# Patient Record
Sex: FEMALE | Race: WHITE | NOT HISPANIC OR LATINO
[De-identification: names, ages, dates, MRNs, and addresses within clinical notes are randomized per-mention and may not be internally consistent; named-entity substitution may affect disease eponyms.]

---

## 2017-03-02 ENCOUNTER — APPOINTMENT (OUTPATIENT)
Dept: OTOLARYNGOLOGY | Facility: CLINIC | Age: 72
End: 2017-03-02

## 2017-03-02 ENCOUNTER — RESULT REVIEW (OUTPATIENT)
Age: 72
End: 2017-03-02

## 2017-03-02 DIAGNOSIS — I10 ESSENTIAL (PRIMARY) HYPERTENSION: ICD-10-CM

## 2017-03-02 DIAGNOSIS — Z83.49 FAMILY HISTORY OF OTHER ENDOCRINE, NUTRITIONAL AND METABOLIC DISEASES: ICD-10-CM

## 2017-03-02 DIAGNOSIS — Z83.3 FAMILY HISTORY OF DIABETES MELLITUS: ICD-10-CM

## 2017-03-02 DIAGNOSIS — Z78.9 OTHER SPECIFIED HEALTH STATUS: ICD-10-CM

## 2017-03-03 ENCOUNTER — OUTPATIENT (OUTPATIENT)
Dept: OUTPATIENT SERVICES | Facility: HOSPITAL | Age: 72
LOS: 1 days | End: 2017-03-03
Payer: COMMERCIAL

## 2017-03-03 DIAGNOSIS — C44.321 SQUAMOUS CELL CARCINOMA OF SKIN OF NOSE: ICD-10-CM

## 2017-03-03 PROBLEM — I10 BENIGN ESSENTIAL HYPERTENSION: Status: RESOLVED | Noted: 2017-03-03 | Resolved: 2017-03-03

## 2017-03-03 PROBLEM — Z83.3 FAMILY HISTORY OF DIABETES MELLITUS: Status: ACTIVE | Noted: 2017-03-03

## 2017-03-03 PROBLEM — Z83.49 FAMILY HISTORY OF HYPERLIPIDEMIA: Status: ACTIVE | Noted: 2017-03-03

## 2017-03-03 PROBLEM — Z78.9 ALCOHOL INGESTION: Status: ACTIVE | Noted: 2017-03-03

## 2017-03-06 ENCOUNTER — FORM ENCOUNTER (OUTPATIENT)
Age: 72
End: 2017-03-06

## 2017-03-07 ENCOUNTER — OUTPATIENT (OUTPATIENT)
Dept: OUTPATIENT SERVICES | Facility: HOSPITAL | Age: 72
LOS: 1 days | End: 2017-03-07
Payer: COMMERCIAL

## 2017-03-07 LAB — SURGICAL PATHOLOGY STUDY: SIGNIFICANT CHANGE UP

## 2017-03-07 PROCEDURE — 70491 CT SOFT TISSUE NECK W/DYE: CPT | Mod: 26

## 2017-03-07 PROCEDURE — 70486 CT MAXILLOFACIAL W/O DYE: CPT | Mod: 26

## 2017-03-12 ENCOUNTER — FORM ENCOUNTER (OUTPATIENT)
Age: 72
End: 2017-03-12

## 2017-03-13 ENCOUNTER — RESULT REVIEW (OUTPATIENT)
Age: 72
End: 2017-03-13

## 2017-03-13 ENCOUNTER — OUTPATIENT (OUTPATIENT)
Dept: OUTPATIENT SERVICES | Facility: HOSPITAL | Age: 72
LOS: 1 days | End: 2017-03-13
Payer: COMMERCIAL

## 2017-03-13 PROCEDURE — 78195 LYMPH SYSTEM IMAGING: CPT | Mod: 26

## 2017-03-13 NOTE — ASU PATIENT PROFILE, ADULT - PMH
Cataracts, bilateral    Diabetes mellitus    High cholesterol    Hypertension    Malignant melanoma    PAF (paroxysmal atrial fibrillation)  with rapid vr  SCC (squamous cell carcinoma)  nose  Umbilical hernia Cataracts, bilateral    Diabetes mellitus    Ectopic pregnancy    High cholesterol    Hypertension    Malignant melanoma    PAF (paroxysmal atrial fibrillation)  with rapid vr  SCC (squamous cell carcinoma)  nose  Umbilical hernia

## 2017-03-13 NOTE — ASU PATIENT PROFILE, ADULT - PSH
H/O bilateral salpingo-oophorectomy H/O:  section    History of salpingo-oophorectomy H/O:  section    History of Mohs surgery for squamous cell carcinoma in situ of skin  nose  History of salpingo-oophorectomy Elective surgery  heart recorder  H/O:  section    History of Mohs surgery for squamous cell carcinoma in situ of skin  nose  History of salpingo-oophorectomy

## 2017-03-14 ENCOUNTER — APPOINTMENT (OUTPATIENT)
Dept: OTOLARYNGOLOGY | Facility: HOSPITAL | Age: 72
End: 2017-03-14

## 2017-03-14 ENCOUNTER — INPATIENT (INPATIENT)
Facility: HOSPITAL | Age: 72
LOS: 0 days | Discharge: HOME | DRG: 113 | End: 2017-03-15
Attending: OTOLARYNGOLOGY | Admitting: OTOLARYNGOLOGY
Payer: COMMERCIAL

## 2017-03-14 VITALS
DIASTOLIC BLOOD PRESSURE: 86 MMHG | OXYGEN SATURATION: 96 % | RESPIRATION RATE: 18 BRPM | HEIGHT: 62 IN | SYSTOLIC BLOOD PRESSURE: 121 MMHG | HEART RATE: 83 BPM | WEIGHT: 138.89 LBS | TEMPERATURE: 98 F

## 2017-03-14 DIAGNOSIS — Z41.9 ENCOUNTER FOR PROCEDURE FOR PURPOSES OTHER THAN REMEDYING HEALTH STATE, UNSPECIFIED: Chronic | ICD-10-CM

## 2017-03-14 DIAGNOSIS — Z90.79 ACQUIRED ABSENCE OF OTHER GENITAL ORGAN(S): Chronic | ICD-10-CM

## 2017-03-14 DIAGNOSIS — Z98.891 HISTORY OF UTERINE SCAR FROM PREVIOUS SURGERY: Chronic | ICD-10-CM

## 2017-03-14 DIAGNOSIS — Z98.890 OTHER SPECIFIED POSTPROCEDURAL STATES: Chronic | ICD-10-CM

## 2017-03-14 PROCEDURE — 38542 EXPLORE DEEP NODE(S) NECK: CPT | Mod: RT

## 2017-03-14 PROCEDURE — 31231 NASAL ENDOSCOPY DX: CPT | Mod: 59

## 2017-03-14 PROCEDURE — 21016 RESECT FACE/SCALP TUM 2 CM/>: CPT

## 2017-03-14 PROCEDURE — 67400 EXPLORE/BIOPSY EYE SOCKET: CPT | Mod: RT

## 2017-03-14 PROCEDURE — 30150 RHINECTOMY PARTIAL: CPT

## 2017-03-14 PROCEDURE — 38900 IO MAP OF SENT LYMPH NODE: CPT

## 2017-03-14 PROCEDURE — 42410 EXCISE PAROTID GLAND/LESION: CPT | Mod: RT

## 2017-03-14 RX ORDER — POTASSIUM CHLORIDE 20 MEQ
10 PACKET (EA) ORAL DAILY
Qty: 0 | Refills: 0 | Status: DISCONTINUED | OUTPATIENT
Start: 2017-03-14 | End: 2017-03-15

## 2017-03-14 RX ORDER — OXYCODONE HYDROCHLORIDE 5 MG/1
5 TABLET ORAL EVERY 8 HOURS
Qty: 0 | Refills: 0 | Status: DISCONTINUED | OUTPATIENT
Start: 2017-03-14 | End: 2017-03-15

## 2017-03-14 RX ORDER — SODIUM CHLORIDE 9 MG/ML
1000 INJECTION INTRAMUSCULAR; INTRAVENOUS; SUBCUTANEOUS
Qty: 0 | Refills: 0 | Status: DISCONTINUED | OUTPATIENT
Start: 2017-03-14 | End: 2017-03-15

## 2017-03-14 RX ORDER — ONDANSETRON 8 MG/1
4 TABLET, FILM COATED ORAL EVERY 6 HOURS
Qty: 0 | Refills: 0 | Status: DISCONTINUED | OUTPATIENT
Start: 2017-03-14 | End: 2017-03-15

## 2017-03-14 RX ORDER — ACETAMINOPHEN 500 MG
325 TABLET ORAL EVERY 4 HOURS
Qty: 0 | Refills: 0 | Status: DISCONTINUED | OUTPATIENT
Start: 2017-03-14 | End: 2017-03-15

## 2017-03-14 RX ORDER — DILTIAZEM HCL 120 MG
300 CAPSULE, EXT RELEASE 24 HR ORAL DAILY
Qty: 0 | Refills: 0 | Status: DISCONTINUED | OUTPATIENT
Start: 2017-03-14 | End: 2017-03-15

## 2017-03-14 RX ORDER — AMIODARONE HYDROCHLORIDE 400 MG/1
200 TABLET ORAL AT BEDTIME
Qty: 0 | Refills: 0 | Status: DISCONTINUED | OUTPATIENT
Start: 2017-03-14 | End: 2017-03-15

## 2017-03-14 RX ORDER — DIGOXIN 250 MCG
0.12 TABLET ORAL DAILY
Qty: 0 | Refills: 0 | Status: DISCONTINUED | OUTPATIENT
Start: 2017-03-14 | End: 2017-03-15

## 2017-03-14 RX ORDER — AMIODARONE HYDROCHLORIDE 400 MG/1
100 TABLET ORAL DAILY
Qty: 0 | Refills: 0 | Status: DISCONTINUED | OUTPATIENT
Start: 2017-03-14 | End: 2017-03-14

## 2017-03-14 RX ADMIN — ONDANSETRON 4 MILLIGRAM(S): 8 TABLET, FILM COATED ORAL at 13:29

## 2017-03-14 RX ADMIN — AMIODARONE HYDROCHLORIDE 200 MILLIGRAM(S): 400 TABLET ORAL at 23:10

## 2017-03-14 RX ADMIN — SODIUM CHLORIDE 100 MILLILITER(S): 9 INJECTION INTRAMUSCULAR; INTRAVENOUS; SUBCUTANEOUS at 20:27

## 2017-03-14 RX ADMIN — Medication 325 MILLIGRAM(S): at 20:25

## 2017-03-14 RX ADMIN — Medication 325 MILLIGRAM(S): at 21:25

## 2017-03-14 RX ADMIN — Medication 0.12 MILLIGRAM(S): at 18:06

## 2017-03-14 NOTE — BRIEF OPERATIVE NOTE - PROCEDURE
Biopsy, lymph node, sentinel  03/14/2017  right parotid and level 3 neck  Active  OAHMED  Wide local excision, lesion, skin, head and neck  03/14/2017  WLE and partial rhinectomy of right naso-facial cutaneous SCC  Active  OAHMED

## 2017-03-14 NOTE — PROGRESS NOTE ADULT - SUBJECTIVE AND OBJECTIVE BOX
ENT [facility] POST OP CHECK    Procedure    No acute events post op. Pain controlled. Tolerating PO. Ambulating. Denies nausea/vomiting.       MEDICATIONS:  Antiinfectives:     IV fluids:  sodium chloride 0.9%. 1000milliLiter(s) IV Continuous <Continuous>  potassium chloride    Tablet ER 10milliEquivalent(s) Oral daily    Hematologic/Anticoagulation:    Pain medications/Neuro:  acetaminophen   Tablet 325milliGRAM(s) Oral every 4 hours PRN  acetaminophen   Tablet. 325milliGRAM(s) Oral every 4 hours PRN  oxyCODONE  5 mG/acetaminophen 325 mG 1Tablet(s) Oral every 4 hours PRN  ondansetron Injectable 4milliGRAM(s) IV Push every 6 hours PRN  oxyCODONE IR 5milliGRAM(s) Oral every 8 hours PRN  busPIRone 5milliGRAM(s) Oral daily    Endocrine Medications:     All other standing medications:   digoxin     Tablet 0.125milliGRAM(s) Oral daily  diltiazem   CD 300milliGRAM(s) Oral daily    All other PRN medications:      Vital Signs Last 24 Hrs  T(C): 36.5, Max: 37.1 (03-14 @ 15:15)  T(F): 97.7, Max: 98.7 (03-14 @ 15:15)  HR: 89 (78 - 90)  BP: 157/83 (121/86 - 161/69)  BP(mean): --  RR: 18 (14 - 18)  SpO2: 96% (94% - 99%)      I & Os for current day (as of 03-14 @ 17:49)  =============================================  IN:    sodium chloride 0.9%.: 400 ml    Total IN: 400 ml  ---------------------------------------------  OUT:    Total OUT: 0 ml  ---------------------------------------------  Total NET: 400 ml        PHYSICAL EXAM:    ENT EXAM-   Constitutional: Well-developed, well-nourished.    Face: Incision C/D/I, soft, flat, minimal drainage. Dressing minimally saturated. replaced  OC/OP:  Floor of mouth, buccal mucosa, lips, hard palate, soft palate, uvula, posterior pharyngeal wall normal.  Mucosa moist.  Neuro/Psych:  A&O x 3.  Mood stable.  Affect appropriate  Pulm:  No dyspnea, non-labored breathing on room air    LABS:  CBC-      Coagulation Studies-    Endocrine Panel-              RADIOLOGY & ADDITIONAL STUDIES:      Assessment/Plan:  71y Female s/p excision of nasal mass  - pain control  - anti-emetics prn  - regular diet  - ambulate  - discussed case with dr diggs who also examined the patient and agrees with the plan    PPX: SCDs, I/S    Dispo planning: no needs

## 2017-03-15 VITALS
SYSTOLIC BLOOD PRESSURE: 116 MMHG | HEART RATE: 94 BPM | OXYGEN SATURATION: 95 % | DIASTOLIC BLOOD PRESSURE: 84 MMHG | TEMPERATURE: 98 F | RESPIRATION RATE: 16 BRPM

## 2017-03-15 PROCEDURE — 88341 IMHCHEM/IMCYTCHM EA ADD ANTB: CPT

## 2017-03-15 PROCEDURE — 88307 TISSUE EXAM BY PATHOLOGIST: CPT

## 2017-03-15 PROCEDURE — 88305 TISSUE EXAM BY PATHOLOGIST: CPT

## 2017-03-15 RX ADMIN — Medication 325 MILLIGRAM(S): at 06:35

## 2017-03-15 RX ADMIN — Medication 300 MILLIGRAM(S): at 05:06

## 2017-03-15 RX ADMIN — Medication 325 MILLIGRAM(S): at 07:12

## 2017-03-15 RX ADMIN — Medication 325 MILLIGRAM(S): at 03:00

## 2017-03-15 RX ADMIN — Medication 5 MILLIGRAM(S): at 10:44

## 2017-03-15 RX ADMIN — Medication 10 MILLIEQUIVALENT(S): at 10:44

## 2017-03-15 RX ADMIN — Medication 325 MILLIGRAM(S): at 02:03

## 2017-03-15 NOTE — PROGRESS NOTE ADULT - SUBJECTIVE AND OBJECTIVE BOX
ENT [facility]NS Progress    No issues overnight. Pain controlled. No N/V. Mild serosanguinous drainage from facial wound requiring 3-4 dressing changes. Also adds that about 2 weeks ago, she had bilateral sudden hearing decrease while on antibiotics. Antibiotics were stopped. Has severe hearing decrease b/l with hearing aids.    AFVSS, NAD  R neck incision c/d/i, flat  R naso-facial wound with xeroform sutured in place, scant serosang drainage  R eye with mild epiphora and hyperemia. EOMI, no pain on EOMs, vision grossly intact    A/P:  POD#0 s/p WLE of R facial cutaneous SCC and R neck SLNB. Doing well   - Dispo planning, likely discharge home today  - Will have pt see Dr. Ponce as outpatient for b/l hearing decrease eval, possibly antibx related ototoxicity  - Pain control  - Dressing changes PRN  - Pt to see Plastic Surgery as outpatient for reconstruction planning  - Reg diet ENT HNS Progress    No issues overnight. Pain controlled. No N/V. Mild serosanguinous drainage from facial wound requiring 3-4 dressing changes. Also adds that about 2 weeks ago, she had bilateral hearing decrease which has felt like "being under water." Has had similar sensation years ago requiring ear tubes. At baseline has poor hearing b/l. Over past 2 weeks, has been using 's old hearing aids for amplification.    AFVSS, NAD  R neck incision c/d/i, flat  R naso-facial wound with xeroform sutured in place, scant serosang drainage  R eye with mild epiphora and hyperemia. EOMI, no pain on EOMs, vision grossly intact  Vasquez midline  AC = BC b/l  TMs dull appearing b/l, EACs wnl    A/P:  POD#0 s/p WLE of R facial cutaneous SCC and R neck SLNB. Doing well   - Dispo planning, likely discharge home today  - Will have pt see Dr. Ponce as outpatient for b/l hearing decrease eval, likely conductive hearing loss 2/2 effusion  - Pain control  - Dressing changes PRN  - Pt to see Plastic Surgery as outpatient for reconstruction planning  - Reg diet

## 2017-03-17 DIAGNOSIS — C79.2 SECONDARY MALIGNANT NEOPLASM OF SKIN: ICD-10-CM

## 2017-03-17 DIAGNOSIS — F32.9 MAJOR DEPRESSIVE DISORDER, SINGLE EPISODE, UNSPECIFIED: ICD-10-CM

## 2017-03-17 DIAGNOSIS — E11.9 TYPE 2 DIABETES MELLITUS WITHOUT COMPLICATIONS: ICD-10-CM

## 2017-03-17 DIAGNOSIS — C76.0 MALIGNANT NEOPLASM OF HEAD, FACE AND NECK: ICD-10-CM

## 2017-03-17 DIAGNOSIS — H91.8X9 OTHER SPECIFIED HEARING LOSS, UNSPECIFIED EAR: ICD-10-CM

## 2017-03-17 DIAGNOSIS — I48.0 PAROXYSMAL ATRIAL FIBRILLATION: ICD-10-CM

## 2017-03-17 DIAGNOSIS — I10 ESSENTIAL (PRIMARY) HYPERTENSION: ICD-10-CM

## 2017-03-17 DIAGNOSIS — K21.9 GASTRO-ESOPHAGEAL REFLUX DISEASE WITHOUT ESOPHAGITIS: ICD-10-CM

## 2017-03-17 DIAGNOSIS — C44.122 SQUAMOUS CELL CARCINOMA OF SKIN OF RIGHT EYELID, INCLUDING CANTHUS: ICD-10-CM

## 2017-03-17 LAB — SURGICAL PATHOLOGY STUDY: SIGNIFICANT CHANGE UP

## 2017-03-20 ENCOUNTER — APPOINTMENT (OUTPATIENT)
Dept: OTOLARYNGOLOGY | Facility: CLINIC | Age: 72
End: 2017-03-20

## 2017-03-20 VITALS
HEIGHT: 62 IN | SYSTOLIC BLOOD PRESSURE: 113 MMHG | RESPIRATION RATE: 16 BRPM | DIASTOLIC BLOOD PRESSURE: 64 MMHG | WEIGHT: 136.91 LBS | TEMPERATURE: 97 F | OXYGEN SATURATION: 98 % | HEART RATE: 76 BPM

## 2017-03-20 PROBLEM — H26.9 UNSPECIFIED CATARACT: Chronic | Status: ACTIVE | Noted: 2017-03-13

## 2017-03-20 PROBLEM — I48.0 PAROXYSMAL ATRIAL FIBRILLATION: Chronic | Status: ACTIVE | Noted: 2017-03-13

## 2017-03-20 PROBLEM — I10 ESSENTIAL (PRIMARY) HYPERTENSION: Chronic | Status: ACTIVE | Noted: 2017-03-13

## 2017-03-20 PROBLEM — C44.92 SQUAMOUS CELL CARCINOMA OF SKIN, UNSPECIFIED: Chronic | Status: ACTIVE | Noted: 2017-03-13

## 2017-03-20 PROBLEM — C43.9 MALIGNANT MELANOMA OF SKIN, UNSPECIFIED: Chronic | Status: ACTIVE | Noted: 2017-03-13

## 2017-03-20 PROBLEM — O00.90 UNSPECIFIED ECTOPIC PREGNANCY WITHOUT INTRAUTERINE PREGNANCY: Chronic | Status: ACTIVE | Noted: 2017-03-14

## 2017-03-20 PROBLEM — E78.00 PURE HYPERCHOLESTEROLEMIA, UNSPECIFIED: Chronic | Status: ACTIVE | Noted: 2017-03-13

## 2017-03-20 PROBLEM — K42.9 UMBILICAL HERNIA WITHOUT OBSTRUCTION OR GANGRENE: Chronic | Status: ACTIVE | Noted: 2017-03-13

## 2017-03-20 NOTE — PATIENT PROFILE ADULT. - PSH
Elective surgery  heart recorder  H/O:  section    History of Mohs surgery for squamous cell carcinoma in situ of skin  nose  History of salpingo-oophorectomy

## 2017-03-20 NOTE — PATIENT PROFILE ADULT. - PMH
Cataracts, bilateral    Diabetes mellitus    Ectopic pregnancy    High cholesterol    Hypertension    Malignant melanoma    PAF (paroxysmal atrial fibrillation)  with rapid vr  SCC (squamous cell carcinoma)  nose  Surgery, elective  tumor removal, nose, 3/14/2017  Umbilical hernia Cataracts, bilateral    Depression    Diabetes mellitus    Ectopic pregnancy    High cholesterol    Hypertension    Malignant melanoma    PAF (paroxysmal atrial fibrillation)  with rapid vr  SCC (squamous cell carcinoma)  nose  Surgery, elective  tumor removal, nose, 3/14/2017  Umbilical hernia

## 2017-03-21 ENCOUNTER — OTHER (OUTPATIENT)
Age: 72
End: 2017-03-21

## 2017-03-21 ENCOUNTER — INPATIENT (INPATIENT)
Facility: HOSPITAL | Age: 72
LOS: 0 days | Discharge: HOME | DRG: 502 | End: 2017-03-22
Attending: SURGERY | Admitting: SURGERY
Payer: COMMERCIAL

## 2017-03-21 DIAGNOSIS — Z98.890 OTHER SPECIFIED POSTPROCEDURAL STATES: Chronic | ICD-10-CM

## 2017-03-21 DIAGNOSIS — Z98.891 HISTORY OF UTERINE SCAR FROM PREVIOUS SURGERY: Chronic | ICD-10-CM

## 2017-03-21 DIAGNOSIS — Z90.79 ACQUIRED ABSENCE OF OTHER GENITAL ORGAN(S): Chronic | ICD-10-CM

## 2017-03-21 DIAGNOSIS — Z41.9 ENCOUNTER FOR PROCEDURE FOR PURPOSES OTHER THAN REMEDYING HEALTH STATE, UNSPECIFIED: Chronic | ICD-10-CM

## 2017-03-21 DIAGNOSIS — C44.92 SQUAMOUS CELL CARCINOMA OF SKIN, UNSPECIFIED: ICD-10-CM

## 2017-03-21 PROCEDURE — 88321 CONSLTJ&REPRT SLD PREP ELSWR: CPT

## 2017-03-21 RX ORDER — INSULIN LISPRO 100/ML
VIAL (ML) SUBCUTANEOUS
Qty: 0 | Refills: 0 | Status: DISCONTINUED | OUTPATIENT
Start: 2017-03-21 | End: 2017-03-22

## 2017-03-21 RX ORDER — DIGOXIN 250 MCG
0.12 TABLET ORAL DAILY
Qty: 0 | Refills: 0 | Status: DISCONTINUED | OUTPATIENT
Start: 2017-03-21 | End: 2017-03-22

## 2017-03-21 RX ORDER — POTASSIUM CHLORIDE 20 MEQ
10 PACKET (EA) ORAL DAILY
Qty: 0 | Refills: 0 | Status: DISCONTINUED | OUTPATIENT
Start: 2017-03-21 | End: 2017-03-22

## 2017-03-21 RX ORDER — ONDANSETRON 8 MG/1
4 TABLET, FILM COATED ORAL EVERY 6 HOURS
Qty: 0 | Refills: 0 | Status: DISCONTINUED | OUTPATIENT
Start: 2017-03-21 | End: 2017-03-22

## 2017-03-21 RX ORDER — DEXTROSE 50 % IN WATER 50 %
1 SYRINGE (ML) INTRAVENOUS ONCE
Qty: 0 | Refills: 0 | Status: DISCONTINUED | OUTPATIENT
Start: 2017-03-21 | End: 2017-03-22

## 2017-03-21 RX ORDER — DEXTROSE 50 % IN WATER 50 %
25 SYRINGE (ML) INTRAVENOUS ONCE
Qty: 0 | Refills: 0 | Status: DISCONTINUED | OUTPATIENT
Start: 2017-03-21 | End: 2017-03-22

## 2017-03-21 RX ORDER — LABETALOL HCL 100 MG
10 TABLET ORAL
Qty: 0 | Refills: 0 | Status: DISCONTINUED | OUTPATIENT
Start: 2017-03-21 | End: 2017-03-22

## 2017-03-21 RX ORDER — CHOLECALCIFEROL (VITAMIN D3) 125 MCG
1000 CAPSULE ORAL DAILY
Qty: 0 | Refills: 0 | Status: DISCONTINUED | OUTPATIENT
Start: 2017-03-21 | End: 2017-03-22

## 2017-03-21 RX ORDER — BACITRACIN ZINC 500 UNIT/G
1 OINTMENT IN PACKET (EA) TOPICAL
Qty: 0 | Refills: 0 | Status: DISCONTINUED | OUTPATIENT
Start: 2017-03-21 | End: 2017-03-22

## 2017-03-21 RX ORDER — DILTIAZEM HCL 120 MG
300 CAPSULE, EXT RELEASE 24 HR ORAL DAILY
Qty: 0 | Refills: 0 | Status: DISCONTINUED | OUTPATIENT
Start: 2017-03-21 | End: 2017-03-22

## 2017-03-21 RX ORDER — GLUCAGON INJECTION, SOLUTION 0.5 MG/.1ML
1 INJECTION, SOLUTION SUBCUTANEOUS ONCE
Qty: 0 | Refills: 0 | Status: DISCONTINUED | OUTPATIENT
Start: 2017-03-21 | End: 2017-03-22

## 2017-03-21 RX ORDER — ACETAMINOPHEN 500 MG
650 TABLET ORAL EVERY 6 HOURS
Qty: 0 | Refills: 0 | Status: DISCONTINUED | OUTPATIENT
Start: 2017-03-22 | End: 2017-03-22

## 2017-03-21 RX ORDER — DEXTROSE 50 % IN WATER 50 %
12.5 SYRINGE (ML) INTRAVENOUS ONCE
Qty: 0 | Refills: 0 | Status: DISCONTINUED | OUTPATIENT
Start: 2017-03-21 | End: 2017-03-22

## 2017-03-21 RX ORDER — MORPHINE SULFATE 50 MG/1
2 CAPSULE, EXTENDED RELEASE ORAL
Qty: 0 | Refills: 0 | Status: DISCONTINUED | OUTPATIENT
Start: 2017-03-21 | End: 2017-03-22

## 2017-03-21 RX ORDER — PANTOPRAZOLE SODIUM 20 MG/1
40 TABLET, DELAYED RELEASE ORAL
Qty: 0 | Refills: 0 | Status: DISCONTINUED | OUTPATIENT
Start: 2017-03-21 | End: 2017-03-22

## 2017-03-21 RX ORDER — ASPIRIN/CALCIUM CARB/MAGNESIUM 324 MG
81 TABLET ORAL DAILY
Qty: 0 | Refills: 0 | Status: DISCONTINUED | OUTPATIENT
Start: 2017-03-21 | End: 2017-03-21

## 2017-03-21 RX ORDER — CEFAZOLIN SODIUM 1 G
1000 VIAL (EA) INJECTION EVERY 8 HOURS
Qty: 0 | Refills: 0 | Status: DISCONTINUED | OUTPATIENT
Start: 2017-03-21 | End: 2017-03-22

## 2017-03-21 RX ORDER — AMIODARONE HYDROCHLORIDE 400 MG/1
200 TABLET ORAL DAILY
Qty: 0 | Refills: 0 | Status: DISCONTINUED | OUTPATIENT
Start: 2017-03-22 | End: 2017-03-22

## 2017-03-21 RX ORDER — PREGABALIN 225 MG/1
1000 CAPSULE ORAL DAILY
Qty: 0 | Refills: 0 | Status: DISCONTINUED | OUTPATIENT
Start: 2017-03-21 | End: 2017-03-22

## 2017-03-21 RX ORDER — SODIUM CHLORIDE 9 MG/ML
1000 INJECTION, SOLUTION INTRAVENOUS
Qty: 0 | Refills: 0 | Status: DISCONTINUED | OUTPATIENT
Start: 2017-03-21 | End: 2017-03-22

## 2017-03-21 RX ORDER — ASPIRIN/CALCIUM CARB/MAGNESIUM 324 MG
81 TABLET ORAL DAILY
Qty: 0 | Refills: 0 | Status: DISCONTINUED | OUTPATIENT
Start: 2017-03-21 | End: 2017-03-22

## 2017-03-21 RX ADMIN — Medication: at 12:17

## 2017-03-21 RX ADMIN — Medication 100 MILLIGRAM(S): at 22:00

## 2017-03-21 RX ADMIN — Medication: at 17:56

## 2017-03-21 RX ADMIN — Medication: at 22:21

## 2017-03-21 RX ADMIN — Medication 100 MILLIGRAM(S): at 14:50

## 2017-03-21 NOTE — H&P PST ADULT - HISTORY OF PRESENT ILLNESS
71F s/p WLE of R facial cutaneous SCC and R neck SLNB on 3/14 with Dr. Carter now here for nasal reconstruction with Dr. Elliott.

## 2017-03-22 ENCOUNTER — TRANSCRIPTION ENCOUNTER (OUTPATIENT)
Age: 72
End: 2017-03-22

## 2017-03-22 VITALS — TEMPERATURE: 98 F

## 2017-03-22 PROCEDURE — 36415 COLL VENOUS BLD VENIPUNCTURE: CPT

## 2017-03-22 PROCEDURE — 83036 HEMOGLOBIN GLYCOSYLATED A1C: CPT

## 2017-03-22 RX ORDER — BACITRACIN ZINC 500 UNIT/G
1 OINTMENT IN PACKET (EA) TOPICAL
Qty: 1 | Refills: 0 | OUTPATIENT
Start: 2017-03-22

## 2017-03-22 RX ADMIN — Medication 5 MILLIGRAM(S): at 11:16

## 2017-03-22 RX ADMIN — PREGABALIN 1000 MICROGRAM(S): 225 CAPSULE ORAL at 11:16

## 2017-03-22 RX ADMIN — Medication 1 APPLICATION(S): at 06:22

## 2017-03-22 RX ADMIN — Medication 1: at 11:51

## 2017-03-22 RX ADMIN — Medication 100 MILLIGRAM(S): at 06:22

## 2017-03-22 RX ADMIN — AMIODARONE HYDROCHLORIDE 200 MILLIGRAM(S): 400 TABLET ORAL at 09:41

## 2017-03-22 RX ADMIN — Medication 81 MILLIGRAM(S): at 11:16

## 2017-03-22 RX ADMIN — Medication 300 MILLIGRAM(S): at 09:41

## 2017-03-22 RX ADMIN — Medication 0.12 MILLIGRAM(S): at 06:22

## 2017-03-22 RX ADMIN — Medication: at 07:51

## 2017-03-22 RX ADMIN — Medication 1000 UNIT(S): at 11:16

## 2017-03-22 NOTE — DISCHARGE NOTE ADULT - MEDICATION SUMMARY - MEDICATIONS TO TAKE
I will START or STAY ON the medications listed below when I get home from the hospital:    slow magnesium  -- 1 tab(s) by mouth once a day  -- Indication: For home medication    aspirin 81 mg oral tablet  -- 1 tab(s) by mouth once a day  -- Indication: For home medication    Percocet 5/325 oral tablet  -- 1 tab(s) by mouth every 4 hours, As Needed -for moderate pain MDD:6  -- Caution federal law prohibits the transfer of this drug to any person other  than the person for whom it was prescribed.  May cause drowsiness.  Alcohol may intensify this effect.  Use care when operating dangerous machinery.  This prescription cannot be refilled.  This product contains acetaminophen.  Do not use  with any other product containing acetaminophen to prevent possible liver damage.  Using more of this medication than prescribed may cause serious breathing problems.    -- Indication: For pain    digoxin 125 mcg (0.125 mg) oral tablet  -- 1 tab(s) by mouth once a day  -- Indication: For home medication    amiodarone 200 mg oral tablet  -- 1 tab(s) by mouth once a day  -- Indication: For home medication    diltiaZEM 300 mg/24 hours oral capsule, extended release  -- 1 cap(s) by mouth once a day  -- Indication: For home medication    BuSpar  -- 5 milligram(s) by mouth once a day  -- Indication: For home medication    bacitracin 500 units/g topical ointment  -- 1 application on skin 2 times a day  -- Indication: For incision     potassium chloride 10 mEq oral capsule, extended release  --  by mouth once a day  -- Indication: For home medication    amoxicillin-clavulanate 875 mg-125 mg oral tablet  -- 1 tab(s) by mouth 2 times a day  -- Finish all this medication unless otherwise directed by prescriber.  Take with food or milk.    -- Indication: For prophylaxis    NexIUM  -- 40 milligram(s) by mouth , As Needed  -- Indication: For home medication    Vitamin D3 1000 intl units oral capsule  -- 1 cap(s) by mouth once a day  -- Indication: For home medication    Vitamin B12 1000 mcg oral tablet  -- 1 tab(s) by mouth once a day  -- Indication: For home medication

## 2017-03-22 NOTE — DISCHARGE NOTE ADULT - PLAN OF CARE
reconstruction •	Report any fever, chills, difficulty breathing, difficulty swallowing, increased bleeding or purulent drainage from your incision site, or any significant new swelling to the doctor immediately.  •	Diet: Resume normal diet, avoid any sharp foods such as chips or pizza crusts for the first day or two after your procedure.   •	Activity: no heavy lifting, do not lift anything heavier than a gallon of milk until after you follow up appointment with your doctor, no strenuous activity such as running or biking.  •	Shower/Bathing:  you may shower from the neck down, but do not get your face wet.   •	Wound care: apply bacitracin twice daily for 10 days. keep incision clean and dry.   •	Take all medications as prescribed.   •	Continue all of your normal home medications unless told otherwise.  •	Avoid any NSAIDS or ibuprofen/asa containing products you may take Tylenol for mild pain.

## 2017-03-22 NOTE — DISCHARGE NOTE ADULT - CARE PROVIDER_API CALL
Vineet Elliott (MD; DDS), Otolaryngology  Head and Neck Surgery  [address] [place] [address]  Phone: [phone]  Fax: [phone]

## 2017-03-22 NOTE — DISCHARGE NOTE ADULT - CARE PLAN
Principal Discharge DX:	SCC (squamous cell carcinoma)  Goal:	reconstruction  Instructions for follow-up, activity and diet:	•	Report any fever, chills, difficulty breathing, difficulty swallowing, increased bleeding or purulent drainage from your incision site, or any significant new swelling to the doctor immediately.  •	Diet: Resume normal diet, avoid any sharp foods such as chips or pizza crusts for the first day or two after your procedure.   •	Activity: no heavy lifting, do not lift anything heavier than a gallon of milk until after you follow up appointment with your doctor, no strenuous activity such as running or biking.  •	Shower/Bathing:  you may shower from the neck down, but do not get your face wet.   •	Wound care: apply bacitracin twice daily for 10 days. keep incision clean and dry.   •	Take all medications as prescribed.   •	Continue all of your normal home medications unless told otherwise.  •	Avoid any NSAIDS or ibuprofen/asa containing products you may take Tylenol for mild pain.

## 2017-03-22 NOTE — DISCHARGE NOTE ADULT - PATIENT PORTAL LINK FT
“You can access the [employer] Patient Portal, offered by [facility], by registering with the following website: [email or web address]”

## 2017-03-24 DIAGNOSIS — Z28.21 IMMUNIZATION NOT CARRIED OUT BECAUSE OF PATIENT REFUSAL: ICD-10-CM

## 2017-03-24 DIAGNOSIS — K21.9 GASTRO-ESOPHAGEAL REFLUX DISEASE WITHOUT ESOPHAGITIS: ICD-10-CM

## 2017-03-24 DIAGNOSIS — M95.2 OTHER ACQUIRED DEFORMITY OF HEAD: ICD-10-CM

## 2017-03-24 DIAGNOSIS — I48.0 PAROXYSMAL ATRIAL FIBRILLATION: ICD-10-CM

## 2017-03-24 DIAGNOSIS — Z98.890 OTHER SPECIFIED POSTPROCEDURAL STATES: ICD-10-CM

## 2017-03-24 DIAGNOSIS — Z87.891 PERSONAL HISTORY OF NICOTINE DEPENDENCE: ICD-10-CM

## 2017-03-24 DIAGNOSIS — C69.92 MALIGNANT NEOPLASM OF UNSPECIFIED SITE OF LEFT EYE: ICD-10-CM

## 2017-03-24 DIAGNOSIS — I10 ESSENTIAL (PRIMARY) HYPERTENSION: ICD-10-CM

## 2017-03-24 DIAGNOSIS — Z85.828 PERSONAL HISTORY OF OTHER MALIGNANT NEOPLASM OF SKIN: ICD-10-CM

## 2017-03-24 DIAGNOSIS — E11.9 TYPE 2 DIABETES MELLITUS WITHOUT COMPLICATIONS: ICD-10-CM

## 2017-03-29 PROBLEM — F32.9 MAJOR DEPRESSIVE DISORDER, SINGLE EPISODE, UNSPECIFIED: Chronic | Status: ACTIVE | Noted: 2017-03-20

## 2017-03-29 PROBLEM — Z41.9 ENCOUNTER FOR PROCEDURE FOR PURPOSES OTHER THAN REMEDYING HEALTH STATE, UNSPECIFIED: Chronic | Status: ACTIVE | Noted: 2017-03-20

## 2017-04-03 ENCOUNTER — APPOINTMENT (OUTPATIENT)
Dept: RADIATION ONCOLOGY | Facility: CLINIC | Age: 72
End: 2017-04-03

## 2017-04-03 ENCOUNTER — APPOINTMENT (OUTPATIENT)
Dept: OTOLARYNGOLOGY | Facility: CLINIC | Age: 72
End: 2017-04-03

## 2017-04-03 VITALS
SYSTOLIC BLOOD PRESSURE: 147 MMHG | WEIGHT: 143.5 LBS | DIASTOLIC BLOOD PRESSURE: 81 MMHG | BODY MASS INDEX: 26.41 KG/M2 | HEART RATE: 84 BPM | OXYGEN SATURATION: 96 % | HEIGHT: 62 IN

## 2017-04-03 VITALS
HEIGHT: 62 IN | BODY MASS INDEX: 25.58 KG/M2 | WEIGHT: 139 LBS | SYSTOLIC BLOOD PRESSURE: 147 MMHG | TEMPERATURE: 98.1 F | HEART RATE: 86 BPM | DIASTOLIC BLOOD PRESSURE: 75 MMHG

## 2017-04-03 DIAGNOSIS — C44.321 SQUAMOUS CELL CARCINOMA OF SKIN OF NOSE: ICD-10-CM

## 2017-04-03 DIAGNOSIS — I10 ESSENTIAL (PRIMARY) HYPERTENSION: ICD-10-CM

## 2017-04-10 ENCOUNTER — CLINICAL ADVICE (OUTPATIENT)
Age: 72
End: 2017-04-10

## 2017-04-13 NOTE — ASU PATIENT PROFILE, ADULT - PMH
Cataracts, bilateral    Depression    Diabetes mellitus    Ectopic pregnancy    High cholesterol    Hypertension    Malignant melanoma    PAF (paroxysmal atrial fibrillation)  with rapid vr  SCC (squamous cell carcinoma)  nose  Surgery, elective  tumor removal, nose, 3/14/2017  Umbilical hernia

## 2017-04-14 ENCOUNTER — OUTPATIENT (OUTPATIENT)
Dept: OUTPATIENT SERVICES | Facility: HOSPITAL | Age: 72
LOS: 1 days | Discharge: HOME | End: 2017-04-14
Payer: COMMERCIAL

## 2017-04-14 VITALS
SYSTOLIC BLOOD PRESSURE: 148 MMHG | HEIGHT: 62 IN | TEMPERATURE: 97 F | DIASTOLIC BLOOD PRESSURE: 75 MMHG | RESPIRATION RATE: 18 BRPM | OXYGEN SATURATION: 99 % | HEART RATE: 96 BPM | WEIGHT: 138.01 LBS

## 2017-04-14 VITALS
DIASTOLIC BLOOD PRESSURE: 81 MMHG | TEMPERATURE: 98 F | SYSTOLIC BLOOD PRESSURE: 139 MMHG | RESPIRATION RATE: 15 BRPM | HEART RATE: 88 BPM | OXYGEN SATURATION: 96 %

## 2017-04-14 DIAGNOSIS — E11.9 TYPE 2 DIABETES MELLITUS WITHOUT COMPLICATIONS: ICD-10-CM

## 2017-04-14 DIAGNOSIS — I48.0 PAROXYSMAL ATRIAL FIBRILLATION: ICD-10-CM

## 2017-04-14 DIAGNOSIS — Z98.890 OTHER SPECIFIED POSTPROCEDURAL STATES: Chronic | ICD-10-CM

## 2017-04-14 DIAGNOSIS — Z98.891 HISTORY OF UTERINE SCAR FROM PREVIOUS SURGERY: Chronic | ICD-10-CM

## 2017-04-14 DIAGNOSIS — I10 ESSENTIAL (PRIMARY) HYPERTENSION: ICD-10-CM

## 2017-04-14 DIAGNOSIS — C76.0 MALIGNANT NEOPLASM OF HEAD, FACE AND NECK: ICD-10-CM

## 2017-04-14 DIAGNOSIS — Z48.3 AFTERCARE FOLLOWING SURGERY FOR NEOPLASM: ICD-10-CM

## 2017-04-14 DIAGNOSIS — Z79.82 LONG TERM (CURRENT) USE OF ASPIRIN: ICD-10-CM

## 2017-04-14 DIAGNOSIS — Z90.79 ACQUIRED ABSENCE OF OTHER GENITAL ORGAN(S): Chronic | ICD-10-CM

## 2017-04-14 DIAGNOSIS — Z41.9 ENCOUNTER FOR PROCEDURE FOR PURPOSES OTHER THAN REMEDYING HEALTH STATE, UNSPECIFIED: Chronic | ICD-10-CM

## 2017-04-14 PROCEDURE — 15620 DELAY FLAP F/C/C/N/AX/G/H/F: CPT

## 2017-04-14 RX ORDER — BACITRACIN ZINC 500 UNIT/G
1 OINTMENT IN PACKET (EA) TOPICAL
Qty: 1 | Refills: 0 | OUTPATIENT
Start: 2017-04-14

## 2017-04-14 NOTE — ASU PREOP CHECKLIST - 1.
Dr. Monahan made aware of blood pressure @11:3am bp 148/75. no interventions or new orders at this time pt to proceed with planned procedure Dr. Monahan made aware of vital signs,specifically temperature and blood pressure @11:28am bp 148/75. no interventions or new orders at this time pt to proceed with planned procedure

## 2017-04-21 PROCEDURE — 70486 CT MAXILLOFACIAL W/O DYE: CPT

## 2017-04-21 PROCEDURE — A9541: CPT

## 2017-04-21 PROCEDURE — 70491 CT SOFT TISSUE NECK W/DYE: CPT

## 2017-04-21 PROCEDURE — 78195 LYMPH SYSTEM IMAGING: CPT

## 2017-05-15 VITALS
OXYGEN SATURATION: 88 % | DIASTOLIC BLOOD PRESSURE: 58 MMHG | TEMPERATURE: 98 F | RESPIRATION RATE: 15 BRPM | WEIGHT: 134.92 LBS | SYSTOLIC BLOOD PRESSURE: 131 MMHG | HEART RATE: 83 BPM | HEIGHT: 62 IN

## 2017-05-15 RX ORDER — ASPIRIN/CALCIUM CARB/MAGNESIUM 324 MG
1 TABLET ORAL
Qty: 0 | Refills: 0 | COMMUNITY

## 2017-05-15 RX ORDER — AMIODARONE HYDROCHLORIDE 400 MG/1
1 TABLET ORAL
Qty: 0 | Refills: 0 | COMMUNITY

## 2017-05-16 ENCOUNTER — OUTPATIENT (OUTPATIENT)
Dept: OUTPATIENT SERVICES | Facility: HOSPITAL | Age: 72
LOS: 1 days | Discharge: HOME | End: 2017-05-16
Payer: COMMERCIAL

## 2017-05-16 VITALS
SYSTOLIC BLOOD PRESSURE: 118 MMHG | HEART RATE: 62 BPM | OXYGEN SATURATION: 95 % | RESPIRATION RATE: 19 BRPM | TEMPERATURE: 98 F | DIASTOLIC BLOOD PRESSURE: 66 MMHG

## 2017-05-16 DIAGNOSIS — Z90.79 ACQUIRED ABSENCE OF OTHER GENITAL ORGAN(S): Chronic | ICD-10-CM

## 2017-05-16 DIAGNOSIS — Z98.890 OTHER SPECIFIED POSTPROCEDURAL STATES: Chronic | ICD-10-CM

## 2017-05-16 DIAGNOSIS — Z98.891 HISTORY OF UTERINE SCAR FROM PREVIOUS SURGERY: Chronic | ICD-10-CM

## 2017-05-16 DIAGNOSIS — Z41.9 ENCOUNTER FOR PROCEDURE FOR PURPOSES OTHER THAN REMEDYING HEALTH STATE, UNSPECIFIED: Chronic | ICD-10-CM

## 2017-05-16 PROCEDURE — 15876 SUCTION LIPECTOMY HEAD&NECK: CPT

## 2017-05-16 PROCEDURE — 68815 PROBE NASOLACRIMAL DUCT: CPT | Mod: RT

## 2017-05-16 PROCEDURE — 68720 CREATE TEAR SAC DRAIN: CPT | Mod: RT

## 2017-05-16 PROCEDURE — C1889: CPT

## 2017-05-16 RX ORDER — ONDANSETRON 8 MG/1
4 TABLET, FILM COATED ORAL EVERY 4 HOURS
Qty: 0 | Refills: 0 | Status: DISCONTINUED | OUTPATIENT
Start: 2017-05-16 | End: 2017-05-16

## 2017-05-16 RX ORDER — CEPHALEXIN 500 MG
1 CAPSULE ORAL
Qty: 10 | Refills: 0 | OUTPATIENT
Start: 2017-05-16 | End: 2017-05-21

## 2017-05-16 RX ORDER — SODIUM CHLORIDE 9 MG/ML
1000 INJECTION, SOLUTION INTRAVENOUS
Qty: 0 | Refills: 0 | Status: DISCONTINUED | OUTPATIENT
Start: 2017-05-16 | End: 2017-05-16

## 2017-05-16 RX ORDER — ACETAMINOPHEN 500 MG
650 TABLET ORAL ONCE
Qty: 0 | Refills: 0 | Status: DISCONTINUED | OUTPATIENT
Start: 2017-05-16 | End: 2017-05-16

## 2017-05-16 RX ORDER — MORPHINE SULFATE 50 MG/1
2 CAPSULE, EXTENDED RELEASE ORAL
Qty: 0 | Refills: 0 | Status: DISCONTINUED | OUTPATIENT
Start: 2017-05-16 | End: 2017-05-16

## 2017-05-16 NOTE — BRIEF OPERATIVE NOTE - PROCEDURE
Debulking, flap, face  05/16/2017  debulking of paramedian forhead flap, medial canthopexy, DCR  Active  [id number]

## 2017-05-19 DIAGNOSIS — H04.541 STENOSIS OF RIGHT LACRIMAL CANALICULI: ICD-10-CM

## 2017-05-19 DIAGNOSIS — Z85.820 PERSONAL HISTORY OF MALIGNANT MELANOMA OF SKIN: ICD-10-CM

## 2017-05-19 DIAGNOSIS — H04.201 UNSPECIFIED EPIPHORA, RIGHT SIDE: ICD-10-CM

## 2017-05-19 DIAGNOSIS — L90.5 SCAR CONDITIONS AND FIBROSIS OF SKIN: ICD-10-CM

## 2017-07-26 NOTE — ASU PATIENT PROFILE, ADULT - NS SC CAGE ALCOHOL CUT DOWN
no Posterior Auricular Interpolation Flap Text: A decision was made to reconstruct the defect utilizing an interpolation axial flap and a staged reconstruction.  A telfa template was made of the defect.  This telfa template was then used to outline the posterior auricular interpolation flap.  The donor area for the pedicle flap was then injected with anesthesia.  The flap was excised through the skin and subcutaneous tissue down to the layer of the underlying musculature.  The pedicle flap was carefully excised within this deep plane to maintain its blood supply.  The edges of the donor site were undermined.   The donor site was closed in a primary fashion.  The pedicle was then rotated into position and sutured.  Once the tube was sutured into place, adequate blood supply was confirmed with blanching and refill.  The pedicle was then wrapped with xeroform gauze and dressed appropriately with a telfa and gauze bandage to ensure continued blood supply and protect the attached pedicle.

## 2017-09-07 NOTE — ASU PATIENT PROFILE, ADULT - NS PRO PT RIGHT SUPPORT PERSON
[facility] H&P/Consult:    Patient: Carol Menendez Date: 2017   : 1927 Attending: Glenn Cantrell MD         Admission date: 2017    ICU admit date:  2017  Intubation date:  n/a    Summary: Carol Menendez is a 89 year old female with a history significant for  has a past medical history of Alzheimer's disease; Depression; Diabetes mellitus (CMS/[facility]); Essential (primary) hypertension; Osteoporosis, unspecified (10/26/2009); Other and unspecified hyperlipidemia; Thyroid condition; and Urinary incontinence. who presents after a fall in her memory care unit at half-way. Fall was unwitnessed. She crashed to the floor. Workup at [facility] demonstrated a head laceration which was not repaired and a very small traumatic intraventricular hemorrhage. For this she was transferred to [facility] for further care.     Inpatient Problems  -- Intraventricular Hemorrhage  -- Advanced dementia  -- Hypertension  -- Mechanical Fall  -- Diabetes Mellitus    ASSESSMENT/PLAN    Neuro: Traumatic intraventricular hemorrhage from a fall; advanced dementia; head laceration, parieto-occipital. CT with small IVH, likely result of hypertension. Unusual locale for traumatic injury. Appears to be at her baseline.   -- Repeat head CT in the morning  -- Namenda 10 mg b.i.d.  -- Hold home trazodone    Pulmonary: No acute issues    CV: History of hypertension; with hypertensive urgency on admission. Much improved on nicardipine overnight.   - Home Norvasc 5 mg   - Wean Nicardipine to keep systolic blood pressure 140 mmHg     Renal:  No acute issues  Lab Results   Component Value Date    CREATININE 0.78 2017    GFRNA 66 2017    GFRA 76 2017     GI: Tolerating general diet, no acute issues.     Heme: No acute issues  Lab Results   Component Value Date    WBC 9.6 2017    HGB 13.9 2017     2017     Endocrine: History of diabetes on home Lantus 10  daily, Humalog sliding scale. Hyperglycemia now that PO intake has resumed. Hx of hypothyroid.   -- Restart home lantus  -- Restart home levothyroxine    ID: UA with trace leukocyte esterase, but otherwise unremarkable.     Goals/Disposition:   -- Maintain in ICU; will likely discharge in AM if repeat CT is unchanged as she appears to be at her baseline.     BEST PRACTICES:  - VTE prophylaxis: Yes, Sequential compression device(s)  No pharmacologic Venous Thromboembolism prophylaxis due to Active Bleeding / Risk of Bleeding  - Glycemic control: Sliding scale insulin / ICU glycemic control  - Nutrition: N.p.o. for now  - Therapy/mobilization: Physical therapy consulted    ================================================================    HPI: Carol Menendez is a 89 year old female with a history significant for  has a past medical history of Alzheimer's disease; Depression; Diabetes mellitus (CMS/[facility]); Essential (primary) hypertension; Osteoporosis, unspecified (10/26/2009); Other and unspecified hyperlipidemia; Thyroid condition; and Urinary incontinence. who presents after a fall in her memory care unit at long-term. Fall was unwitnessed. She crashed to the floor. Workup at [facility] demonstrated a head laceration which was not repaired and a very small traumatic intraventricular hemorrhage. She is neurologically intact.        SUBJECTIVE: \"I feel just fine, how are you\". Wandering conversation      ================================================================    I/O last 3 completed shifts:  In: 827 [P.O.:25; I.V.:802]  Out: 350 [Urine:350]  No intake/output data recorded.    Vital Last Value 24 Hour Range   Temperature 98.7 °F (37.1 °C) (09/07/17 0500) Temp  Min: 98.2 °F (36.8 °C)  Max: 98.7 °F (37.1 °C)   Pulse 80 (09/07/17 0630) Pulse  Min: 68  Max: 102   Respiratory 25 (09/07/17 0630) Resp  Min: 16  Max: 67   Non-Invasive  Blood Pressure 157/70 (09/07/17 0630) BP  Min: 96/64   Max: 209/96   Pulse Oximetry 93 % (09/07/17 0630) SpO2  Min: 75 %  Max: 100 %   Arterial   Blood Pressure   No Data Recorded        Physical Exam:  General: Alert, elderly female resting in chair.   Neuro: Oriented to self only.  FC x4 with equal strength to all extremities. Sensation intact throughout. PERRL. Visual acuity intact to all fields. No ataxia to any extremities.   Neck: Supple, without JVD  Chest: Lung sounds clear with auscultation to all fields. Without accessory muscle use.   Heart: S1, S2, no rub, murmur, or gallop  Abdomen: Soft, nontender. Normoactive throughout all abdomonal quadrants.  Extremities: 2+ bilateral radial and DP pulses. No edema.   Skin: Warm, intact.     Pertinent Reviewed: Allergies, Medications, Labs, Imaging and Physician and Nursing Notes    ACCS  ELIO Hair  [phone]    Attending Physician Addendum  I evaluated and examined Carol Menendez with Mr Dupont. Together we reviewed relevant imaging and laboratory data, and jointly formulated the assessment and plan as documented.  ROS unable secondary to patient condition. My comments are reflected below.    Active Diagnoses:   1. Hypertensive crisis with resultant small IVH  2. Scalp laceration  3. Advanced dementia  4. Essential HTN  5. DM Type 2    Plan:   - Stable head CT this AM.   - Stable neurologic exam (at baseline for patient).   - Weaned off nicardipine this AM.   - Plan to return to memory care unit within next 24 hours.     Odessa Singh DO  [facility]  [phone] (pager)     Declines same name as above

## 2018-02-20 NOTE — PATIENT PROFILE ADULT. - NSALCOHOLFREQ_GEN_A_CORE_SD
Outpatient Physical Therapy Ortho Treatment Note   [place]     Patient Name: Pili Webster  : 1990  MRN: [id number]  Today's Date: 2018      Visit Date: 2018    Visit Dx:    ICD-10-CM ICD-9-CM   1. Chronic pain of right knee M25.561 719.46    G89.29 338.29       Patient Active Problem List   Diagnosis   • External hemorrhoid, thrombosed   • Status post laparoscopic cholecystectomy        Past Medical History:   Diagnosis Date   • Abdominal pain    • Anxiety and depression    • Arthritis    • Cholecystitis    • Constipation    • Frequent UTI    • Hemorrhoids    • Kidney stones    • Nausea & vomiting    • PCOS (polycystic ovarian syndrome)    • PONV (postoperative nausea and vomiting)    • Tachycardia         Past Surgical History:   Procedure Laterality Date   • ABDOMINAL SURGERY     • CHOLECYSTECTOMY N/A 2017    Procedure: CHOLECYSTECTOMY LAPAROSCOPIC;  Surgeon: Franco Mari MD;  Location: [facility];  Service:    • DIAGNOSTIC LAPAROSCOPY      x2   • DILATATION AND CURETTAGE     • WISDOM TOOTH EXTRACTION               PT Ortho       18 1500    Subjective Comments    Subjective Comments Patient notes 3/10 pain today; she reports that therapy has been helping with her pain.  -BE    Subjective Pain    Able to rate subjective pain? yes  -BE    Pre-Treatment Pain Level 3  -BE    Post-Treatment Pain Level 3  -BE      User Key  (r) = Recorded By, (t) = Taken By, (c) = Cosigned By    Initials Name Provider Type    BE Pili Walker, PT Physical Therapist                            PT Assessment/Plan       18 6431       PT Assessment    Assessment Comments Patient tolerated today's session well, with no increase in pain noted.  Patient progressed in ther ex to include increased repetitions of SLR and knee flexion with TB.  Verbal and tactile cues required to ensure correct form with exercises.  Patient displayed no adverse reactions with modalities at today's session.   "Ina Mena, PT, DPT, assisted in concluding today's session.  She will continue to be progressed per her tolerance and POC.  -BE     PT Plan    PT Plan Comments Progress per patient's tolerance and POC.  -BE       User Key  (r) = Recorded By, (t) = Taken By, (c) = Cosigned By    Initials Name Provider Type    BE Pili Walker, PT Physical Therapist                Modalities       02/20/18 1500          Moist Heat    MH Applied Yes   no redness following MH  -BE      Location right knee  -BE      Rx Minutes 10 mins  -BE      MH Prior to Rx Yes   w/ estim in supine position  -BE      Ultrasound 18495    Location R) medial knee  -BE      Rx Minutes 8 min  -BE      Duty Cycle 50  -BE      Frequency --   3.3MHz  -BE      Intensity - Wts/cm 1.2  -BE      ELECTRICAL STIMULATION    Attended/Unattended Unattended   no skin irritation observed following estim  -BE      Stimulation Type Pre-Mod  -BE      Max mAmp --   as to pt's tolerance  -BE      Location/Electrode Placement/Other R) knee  -BE      Rx Minutes 10 mins  -BE        User Key  (r) = Recorded By, (t) = Taken By, (c) = Cosigned By    Initials Name Provider Type    BE Pili Walker, PT Physical Therapist                Exercises       02/20/18 1500          Subjective Comments    Subjective Comments Patient notes 3/10 pain today; she reports that therapy has been helping with her pain.  -BE      Subjective Pain    Able to rate subjective pain? yes  -BE      Pre-Treatment Pain Level 3  -BE      Post-Treatment Pain Level 3  -BE      Exercise 1    Exercise Name 1 gastroc stretch 3x20\", ham stretch 3x20\", QS 15x2, SAQ 15x2, SLR 25x, LAQ 15x2, LE bike LV 2.5 x6 min, hip abd w/ tband (red) 10x2, heel slides 15x2, ball squeeze 15x2, knee flex w/ tband (red) 10x3  -BE      Cueing 1 Verbal;Tactile;Demo  -BE      Time (Minutes) 1 40 min  -BE        User Key  (r) = Recorded By, (t) = Taken By, (c) = Cosigned By    Initials Name Provider Type    LUIS JANG" Aaron, PT Physical Therapist                             Therapy Education  Given: HEP, Symptoms/condition management, Pain management  Program: Reinforced  How Provided: Verbal, Demonstration  Provided to: Patient  Level of Understanding: Verbalized, Demonstrated              Time Calculation:   Start Time: 1300  Stop Time: 1410  Time Calculation (min): 70 min    Therapy Charges for Today     Code Description Service Date Service Provider Modifiers Qty    99960226076 HC PT ELECTRICAL STIM UNATTENDED 2/20/2018 Pili Walker, PT  1    44399203627 HC PT THER PROC EA 15 MIN 2/20/2018 Pili Walker, PT GP 3                    Pili Walker, PT  2/20/2018      daily

## 2018-03-08 VITALS
OXYGEN SATURATION: 98 % | RESPIRATION RATE: 18 BRPM | WEIGHT: 138.89 LBS | TEMPERATURE: 97 F | SYSTOLIC BLOOD PRESSURE: 124 MMHG | HEART RATE: 99 BPM | HEIGHT: 62 IN | DIASTOLIC BLOOD PRESSURE: 65 MMHG

## 2018-03-09 ENCOUNTER — OUTPATIENT (OUTPATIENT)
Dept: OUTPATIENT SERVICES | Facility: HOSPITAL | Age: 73
LOS: 1 days | Discharge: HOME | End: 2018-03-09
Payer: COMMERCIAL

## 2018-03-09 DIAGNOSIS — Z98.891 HISTORY OF UTERINE SCAR FROM PREVIOUS SURGERY: Chronic | ICD-10-CM

## 2018-03-09 DIAGNOSIS — Z98.890 OTHER SPECIFIED POSTPROCEDURAL STATES: Chronic | ICD-10-CM

## 2018-03-09 DIAGNOSIS — Z41.9 ENCOUNTER FOR PROCEDURE FOR PURPOSES OTHER THAN REMEDYING HEALTH STATE, UNSPECIFIED: Chronic | ICD-10-CM

## 2018-03-09 DIAGNOSIS — Z90.79 ACQUIRED ABSENCE OF OTHER GENITAL ORGAN(S): Chronic | ICD-10-CM

## 2018-03-09 LAB — GLUCOSE BLDC GLUCOMTR-MCNC: 117 MG/DL — HIGH (ref 70–99)

## 2018-03-09 RX ORDER — ACETAMINOPHEN 500 MG
650 TABLET ORAL EVERY 6 HOURS
Qty: 0 | Refills: 0 | Status: DISCONTINUED | OUTPATIENT
Start: 2018-03-09 | End: 2018-03-09

## 2018-03-09 RX ORDER — METOPROLOL TARTRATE 50 MG
12.5 TABLET ORAL EVERY 12 HOURS
Qty: 0 | Refills: 0 | Status: DISCONTINUED | OUTPATIENT
Start: 2018-03-09 | End: 2018-03-10

## 2018-03-09 RX ORDER — CEFAZOLIN SODIUM 1 G
1000 VIAL (EA) INJECTION EVERY 8 HOURS
Qty: 0 | Refills: 0 | Status: DISCONTINUED | OUTPATIENT
Start: 2018-03-09 | End: 2018-03-10

## 2018-03-09 RX ORDER — MORPHINE SULFATE 50 MG/1
4 CAPSULE, EXTENDED RELEASE ORAL EVERY 4 HOURS
Qty: 0 | Refills: 0 | Status: DISCONTINUED | OUTPATIENT
Start: 2018-03-09 | End: 2018-03-09

## 2018-03-09 RX ORDER — OXYCODONE AND ACETAMINOPHEN 5; 325 MG/1; MG/1
1 TABLET ORAL EVERY 4 HOURS
Qty: 0 | Refills: 0 | Status: DISCONTINUED | OUTPATIENT
Start: 2018-03-09 | End: 2018-03-09

## 2018-03-09 RX ORDER — SODIUM CHLORIDE 9 MG/ML
1000 INJECTION, SOLUTION INTRAVENOUS
Qty: 0 | Refills: 0 | Status: DISCONTINUED | OUTPATIENT
Start: 2018-03-09 | End: 2018-03-10

## 2018-03-09 RX ORDER — CEFAZOLIN SODIUM 1 G
1000 VIAL (EA) INJECTION EVERY 8 HOURS
Qty: 0 | Refills: 0 | Status: DISCONTINUED | OUTPATIENT
Start: 2018-03-09 | End: 2018-03-09

## 2018-03-09 RX ORDER — ACETAMINOPHEN 500 MG
650 TABLET ORAL EVERY 6 HOURS
Qty: 0 | Refills: 0 | Status: DISCONTINUED | OUTPATIENT
Start: 2018-03-09 | End: 2018-03-10

## 2018-03-09 RX ORDER — OXYCODONE AND ACETAMINOPHEN 5; 325 MG/1; MG/1
1 TABLET ORAL EVERY 4 HOURS
Qty: 0 | Refills: 0 | Status: DISCONTINUED | OUTPATIENT
Start: 2018-03-09 | End: 2018-03-10

## 2018-03-09 RX ORDER — MORPHINE SULFATE 50 MG/1
4 CAPSULE, EXTENDED RELEASE ORAL EVERY 4 HOURS
Qty: 0 | Refills: 0 | Status: DISCONTINUED | OUTPATIENT
Start: 2018-03-09 | End: 2018-03-10

## 2018-03-09 RX ORDER — BENZOCAINE AND MENTHOL 5; 1 G/100ML; G/100ML
1 LIQUID ORAL
Qty: 0 | Refills: 0 | Status: DISCONTINUED | OUTPATIENT
Start: 2018-03-09 | End: 2018-03-09

## 2018-03-09 RX ORDER — PANTOPRAZOLE SODIUM 20 MG/1
40 TABLET, DELAYED RELEASE ORAL
Qty: 0 | Refills: 0 | Status: DISCONTINUED | OUTPATIENT
Start: 2018-03-09 | End: 2018-03-10

## 2018-03-09 RX ORDER — DILTIAZEM HCL 120 MG
300 CAPSULE, EXT RELEASE 24 HR ORAL DAILY
Qty: 0 | Refills: 0 | Status: DISCONTINUED | OUTPATIENT
Start: 2018-03-10 | End: 2018-03-10

## 2018-03-09 RX ORDER — MORPHINE SULFATE 50 MG/1
4 CAPSULE, EXTENDED RELEASE ORAL
Qty: 0 | Refills: 0 | Status: DISCONTINUED | OUTPATIENT
Start: 2018-03-09 | End: 2018-03-10

## 2018-03-09 RX ORDER — SODIUM CHLORIDE 9 MG/ML
1000 INJECTION, SOLUTION INTRAVENOUS
Qty: 0 | Refills: 0 | Status: DISCONTINUED | OUTPATIENT
Start: 2018-03-09 | End: 2018-03-09

## 2018-03-09 RX ORDER — DIGOXIN 250 MCG
0.12 TABLET ORAL DAILY
Qty: 0 | Refills: 0 | Status: DISCONTINUED | OUTPATIENT
Start: 2018-03-10 | End: 2018-03-10

## 2018-03-09 RX ORDER — BENZOCAINE AND MENTHOL 5; 1 G/100ML; G/100ML
1 LIQUID ORAL
Qty: 0 | Refills: 0 | Status: DISCONTINUED | OUTPATIENT
Start: 2018-03-09 | End: 2018-03-10

## 2018-03-09 RX ADMIN — Medication 1000 MILLIGRAM(S): at 23:58

## 2018-03-09 NOTE — PROGRESS NOTE ADULT - SUBJECTIVE AND OBJECTIVE BOX
PAUL-HNS POSTOP CHECK NOTE    72F hx carcinoma s/p resection and paramedian forehead flap for recon now POD0 b/l blepharoplasty, scar revision.    POD0: WILLOW. AFVSS. Awake and alert, Tolerating sips of liquids. Pain well controlled. C/o eye pain but no gaze restriction or vision changes.     Vital Signs Last 24 Hrs  T(C): 36.7 (09 Mar 2018 18:29), Max: 36.7 (09 Mar 2018 18:29)  T(F): 98 (09 Mar 2018 18:29), Max: 98 (09 Mar 2018 18:29)  HR: 92 (09 Mar 2018 19:16) (76 - 92)  BP: 122/72 (09 Mar 2018 19:16) (122/72 - 138/75)  BP(mean): 89 (09 Mar 2018 19:16) (89 - 105)  RR: 20 (09 Mar 2018 19:16) (14 - 20)  SpO2: 96% (09 Mar 2018 19:16) (96% - 98%)    PHYSICAL EXAM:  NAD, awake and appropriate  bleph and nasolabial incisions c/d/i  mild periorbital ecchymosis and edema  EOMI, no restriction, VA grossly intact, no proptosis  ERNANDEZ, wwp  nonlabored respirations     A/P:  72F hx carcinoma s/p resection and paramedian forehead flap for recon now POD0 b/l blepharoplasty, scar revision.    -prn pain  -prn nausea  -HOB@30  -ice packs to eyes  -prn HTN  -ambulate  -soft diet  -IVF   -SCDs    Dispo: regional room, 23hr    d/w chief and attending

## 2018-03-10 VITALS
DIASTOLIC BLOOD PRESSURE: 68 MMHG | SYSTOLIC BLOOD PRESSURE: 130 MMHG | TEMPERATURE: 97 F | RESPIRATION RATE: 16 BRPM | OXYGEN SATURATION: 97 % | HEART RATE: 80 BPM

## 2018-03-10 LAB — GLUCOSE BLDC GLUCOMTR-MCNC: 172 MG/DL — HIGH (ref 70–99)

## 2018-03-10 PROCEDURE — 15822 BLEPHAROPLASTY UPPER EYELID: CPT | Mod: 50

## 2018-03-10 PROCEDURE — 15820 BLEPHAROPLASTY LOWER EYELID: CPT | Mod: LT

## 2018-03-10 PROCEDURE — 68815 PROBE NASOLACRIMAL DUCT: CPT

## 2018-03-10 PROCEDURE — 21282 LATERAL CANTHOPEXY: CPT | Mod: LT

## 2018-03-10 PROCEDURE — 82962 GLUCOSE BLOOD TEST: CPT

## 2018-03-10 PROCEDURE — 12052 INTMD RPR FACE/MM 2.6-5.0 CM: CPT

## 2018-03-10 PROCEDURE — 11444 EXC FACE-MM B9+MARG 3.1-4 CM: CPT

## 2018-03-10 RX ORDER — AMOXICILLIN 250 MG/5ML
1 SUSPENSION, RECONSTITUTED, ORAL (ML) ORAL
Qty: 14 | Refills: 0 | OUTPATIENT
Start: 2018-03-10 | End: 2018-03-16

## 2018-03-10 RX ADMIN — OXYCODONE AND ACETAMINOPHEN 1 TABLET(S): 5; 325 TABLET ORAL at 02:22

## 2018-03-10 RX ADMIN — PANTOPRAZOLE SODIUM 40 MILLIGRAM(S): 20 TABLET, DELAYED RELEASE ORAL at 06:58

## 2018-03-10 RX ADMIN — Medication 0.12 MILLIGRAM(S): at 06:58

## 2018-03-10 RX ADMIN — OXYCODONE AND ACETAMINOPHEN 1 TABLET(S): 5; 325 TABLET ORAL at 00:09

## 2018-03-10 RX ADMIN — Medication 300 MILLIGRAM(S): at 06:58

## 2018-03-10 RX ADMIN — Medication 1000 MILLIGRAM(S): at 07:05

## 2018-03-10 NOTE — PROGRESS NOTE ADULT - SUBJECTIVE AND OBJECTIVE BOX
PAUL-HNS POSTOP CHECK NOTE    72F hx carcinoma s/p resection and paramedian forehead flap for recon now POD0 b/l blepharoplasty, scar revision.    Interval: WILLOW. AFVSS. Tolerating PO. Pain well controlled. Denies CP, SOB, n/v, vision changes. Has been using ice packs.    Vital Signs Last 24 Hrs  T(C): 36.2 (10 Mar 2018 09:01), Max: 37 (09 Mar 2018 21:47)  T(F): 97.2 (10 Mar 2018 09:01), Max: 98.6 (09 Mar 2018 21:47)  HR: 80 (10 Mar 2018 09:01) (65 - 96)  BP: 130/68 (10 Mar 2018 09:01) (115/70 - 140/75)  BP(mean): 89 (09 Mar 2018 20:30) (89 - 105)  RR: 16 (10 Mar 2018 09:01) (14 - 20)  SpO2: 97% (10 Mar 2018 09:01) (95% - 99%)    PHYSICAL EXAM:  NAD, awake and appropriate  bleph and nasolabial incisions c/d/i  mild periorbital ecchymosis and edema stable  EOMI, no restriction, PERRLA  VA grossly intact, no proptosis  ERNANDEZ, wwp  nonlabored respirations     A/P:  72F hx carcinoma s/p resection and paramedian forehead flap for recon now POD0 b/l blepharoplasty, scar revision.    -prn pain  -prn nausea  -HOB@30  -ice packs to eyes  -prn HTN  -ambulate  -art tears  -soft diet  -d/c IVF   -SCDs    Dispo: d/c home today    seen with rounding chief and d/w attending

## 2018-11-14 RX ORDER — DIGOXIN 250 MCG
1 TABLET ORAL
Qty: 0 | Refills: 0 | COMMUNITY

## 2018-11-14 RX ORDER — AMIODARONE HYDROCHLORIDE 400 MG/1
1 TABLET ORAL
Qty: 0 | Refills: 0 | COMMUNITY

## 2018-11-15 ENCOUNTER — RESULT REVIEW (OUTPATIENT)
Age: 73
End: 2018-11-15

## 2018-11-15 ENCOUNTER — INPATIENT (INPATIENT)
Facility: HOSPITAL | Age: 73
LOS: 0 days | Discharge: HOME | DRG: 517 | End: 2018-11-16
Attending: SURGERY | Admitting: SURGERY
Payer: COMMERCIAL

## 2018-11-15 VITALS
OXYGEN SATURATION: 96 % | HEIGHT: 62 IN | SYSTOLIC BLOOD PRESSURE: 134 MMHG | WEIGHT: 151.24 LBS | DIASTOLIC BLOOD PRESSURE: 63 MMHG | RESPIRATION RATE: 18 BRPM | TEMPERATURE: 97 F | HEART RATE: 77 BPM

## 2018-11-15 DIAGNOSIS — H26.9 UNSPECIFIED CATARACT: Chronic | ICD-10-CM

## 2018-11-15 DIAGNOSIS — Z41.9 ENCOUNTER FOR PROCEDURE FOR PURPOSES OTHER THAN REMEDYING HEALTH STATE, UNSPECIFIED: Chronic | ICD-10-CM

## 2018-11-15 DIAGNOSIS — Z98.891 HISTORY OF UTERINE SCAR FROM PREVIOUS SURGERY: Chronic | ICD-10-CM

## 2018-11-15 DIAGNOSIS — Z90.79 ACQUIRED ABSENCE OF OTHER GENITAL ORGAN(S): Chronic | ICD-10-CM

## 2018-11-15 DIAGNOSIS — Z98.890 OTHER SPECIFIED POSTPROCEDURAL STATES: Chronic | ICD-10-CM

## 2018-11-15 LAB — GLUCOSE BLDC GLUCOMTR-MCNC: 163 MG/DL — HIGH (ref 70–99)

## 2018-11-15 RX ORDER — SODIUM CHLORIDE 9 MG/ML
1000 INJECTION, SOLUTION INTRAVENOUS
Qty: 0 | Refills: 0 | Status: DISCONTINUED | OUTPATIENT
Start: 2018-11-15 | End: 2018-11-16

## 2018-11-15 RX ORDER — INSULIN LISPRO 100/ML
VIAL (ML) SUBCUTANEOUS
Qty: 0 | Refills: 0 | Status: DISCONTINUED | OUTPATIENT
Start: 2018-11-15 | End: 2018-11-16

## 2018-11-15 RX ORDER — GLUCAGON INJECTION, SOLUTION 0.5 MG/.1ML
1 INJECTION, SOLUTION SUBCUTANEOUS ONCE
Qty: 0 | Refills: 0 | Status: DISCONTINUED | OUTPATIENT
Start: 2018-11-15 | End: 2018-11-16

## 2018-11-15 RX ORDER — ONDANSETRON 8 MG/1
4 TABLET, FILM COATED ORAL EVERY 6 HOURS
Qty: 0 | Refills: 0 | Status: DISCONTINUED | OUTPATIENT
Start: 2018-11-15 | End: 2018-11-16

## 2018-11-15 RX ORDER — DEXTROSE 50 % IN WATER 50 %
25 SYRINGE (ML) INTRAVENOUS ONCE
Qty: 0 | Refills: 0 | Status: DISCONTINUED | OUTPATIENT
Start: 2018-11-15 | End: 2018-11-16

## 2018-11-15 RX ORDER — ACETAMINOPHEN 500 MG
650 TABLET ORAL EVERY 6 HOURS
Qty: 0 | Refills: 0 | Status: DISCONTINUED | OUTPATIENT
Start: 2018-11-15 | End: 2018-11-16

## 2018-11-15 RX ORDER — DEXTROSE 50 % IN WATER 50 %
12.5 SYRINGE (ML) INTRAVENOUS ONCE
Qty: 0 | Refills: 0 | Status: DISCONTINUED | OUTPATIENT
Start: 2018-11-15 | End: 2018-11-16

## 2018-11-15 RX ORDER — OXYCODONE AND ACETAMINOPHEN 5; 325 MG/1; MG/1
1 TABLET ORAL EVERY 4 HOURS
Qty: 0 | Refills: 0 | Status: DISCONTINUED | OUTPATIENT
Start: 2018-11-15 | End: 2018-11-16

## 2018-11-15 RX ORDER — HEPARIN SODIUM 5000 [USP'U]/ML
5000 INJECTION INTRAVENOUS; SUBCUTANEOUS EVERY 12 HOURS
Qty: 0 | Refills: 0 | Status: DISCONTINUED | OUTPATIENT
Start: 2018-11-15 | End: 2018-11-16

## 2018-11-15 RX ORDER — DEXTROSE 50 % IN WATER 50 %
15 SYRINGE (ML) INTRAVENOUS ONCE
Qty: 0 | Refills: 0 | Status: DISCONTINUED | OUTPATIENT
Start: 2018-11-15 | End: 2018-11-16

## 2018-11-15 RX ORDER — MORPHINE SULFATE 50 MG/1
4 CAPSULE, EXTENDED RELEASE ORAL
Qty: 0 | Refills: 0 | Status: COMPLETED | OUTPATIENT
Start: 2018-11-15 | End: 2018-11-15

## 2018-11-15 RX ADMIN — Medication 1: at 16:02

## 2018-11-15 RX ADMIN — MORPHINE SULFATE 4 MILLIGRAM(S): 50 CAPSULE, EXTENDED RELEASE ORAL at 20:26

## 2018-11-15 NOTE — PACU DISCHARGE NOTE - COMMENTS
pt aao x3.  VSS.  facial compression dsg c/d/i; suture line to mid forehead intact; sutures to right nose intact; MAICO to right head to ss.  denies c/o pain at present.  report given to RN on 8 wollman; pt to go to 849 via bed with transport

## 2018-11-15 NOTE — ASU PATIENT PROFILE, ADULT - PMH
Cataracts, bilateral    Depression    Ectopic pregnancy    High cholesterol    Hypertension    Malignant melanoma    PAF (paroxysmal atrial fibrillation)  with rapid vr  SCC (squamous cell carcinoma)  nose  Surgery, elective  tumor removal, nose, 3/14/2017  Umbilical hernia

## 2018-11-15 NOTE — ASU PATIENT PROFILE, ADULT - PSH
Cataracts, bilateral    Elective surgery  heart recorder  removed 2018  H/O:  section    History of Mohs surgery for squamous cell carcinoma in situ of skin  nose  History of salpingo-oophorectomy    Surgery, elective  right eye corner reconstruction x 7

## 2018-11-15 NOTE — BRIEF OPERATIVE NOTE - PROCEDURE
<<-----Click on this checkbox to enter Procedure Rhytidectomy of face and neck  11/15/2018    Active  AFRANTS  Scar revision  11/15/2018    Active  AFRANTS

## 2018-11-15 NOTE — BRIEF OPERATIVE NOTE - OPERATION/FINDINGS
Glabellar scar revision and right nasal scar revision. Rhytidectomy of face and neck. 1 MAICO drain.

## 2018-11-16 VITALS
OXYGEN SATURATION: 98 % | HEART RATE: 80 BPM | DIASTOLIC BLOOD PRESSURE: 75 MMHG | RESPIRATION RATE: 16 BRPM | TEMPERATURE: 99 F | SYSTOLIC BLOOD PRESSURE: 142 MMHG

## 2018-11-16 LAB
GLUCOSE BLDC GLUCOMTR-MCNC: 154 MG/DL — HIGH (ref 70–99)
GLUCOSE BLDC GLUCOMTR-MCNC: 178 MG/DL — HIGH (ref 70–99)
HBA1C BLD-MCNC: 6 % — HIGH (ref 4–5.6)

## 2018-11-16 RX ORDER — OXYCODONE AND ACETAMINOPHEN 5; 325 MG/1; MG/1
1 TABLET ORAL
Qty: 20 | Refills: 0
Start: 2018-11-16 | End: 2018-11-20

## 2018-11-16 RX ORDER — BACITRACIN ZINC 500 UNIT/G
1 OINTMENT IN PACKET (EA) TOPICAL
Qty: 5 | Refills: 0 | OUTPATIENT
Start: 2018-11-16 | End: 2018-11-22

## 2018-11-16 RX ADMIN — OXYCODONE AND ACETAMINOPHEN 1 TABLET(S): 5; 325 TABLET ORAL at 12:49

## 2018-11-16 RX ADMIN — Medication 1 TABLET(S): at 06:20

## 2018-11-16 RX ADMIN — HEPARIN SODIUM 5000 UNIT(S): 5000 INJECTION INTRAVENOUS; SUBCUTANEOUS at 06:20

## 2018-11-16 RX ADMIN — OXYCODONE AND ACETAMINOPHEN 1 TABLET(S): 5; 325 TABLET ORAL at 03:19

## 2018-11-16 RX ADMIN — Medication 1: at 07:43

## 2018-11-20 LAB — SURGICAL PATHOLOGY STUDY: SIGNIFICANT CHANGE UP

## 2018-11-20 PROCEDURE — 83036 HEMOGLOBIN GLYCOSYLATED A1C: CPT

## 2018-11-20 PROCEDURE — 88304 TISSUE EXAM BY PATHOLOGIST: CPT

## 2018-11-20 PROCEDURE — 82962 GLUCOSE BLOOD TEST: CPT

## 2018-11-20 PROCEDURE — 36415 COLL VENOUS BLD VENIPUNCTURE: CPT

## 2018-11-20 PROCEDURE — 88300 SURGICAL PATH GROSS: CPT

## 2018-11-27 DIAGNOSIS — M95.2 OTHER ACQUIRED DEFORMITY OF HEAD: ICD-10-CM

## 2018-11-27 DIAGNOSIS — I10 ESSENTIAL (PRIMARY) HYPERTENSION: ICD-10-CM

## 2018-11-27 DIAGNOSIS — L90.5 SCAR CONDITIONS AND FIBROSIS OF SKIN: ICD-10-CM

## 2018-11-27 DIAGNOSIS — F32.9 MAJOR DEPRESSIVE DISORDER, SINGLE EPISODE, UNSPECIFIED: ICD-10-CM

## 2018-11-27 DIAGNOSIS — Z85.828 PERSONAL HISTORY OF OTHER MALIGNANT NEOPLASM OF SKIN: ICD-10-CM

## 2019-11-06 NOTE — PATIENT PROFILE ADULT. - FUNCTIONAL LEVEL PRIOR: BATHING
Quality 110: Preventive Care And Screening: Influenza Immunization: Influenza Immunization Administered during Influenza season Detail Level: Detailed Quality 130: Documentation Of Current Medications In The Medical Record: Current Medications Documented Quality 131: Pain Assessment And Follow-Up: Pain assessment using a standardized tool is documented as negative, no follow-up plan required Quality 226: Preventive Care And Screening: Tobacco Use: Screening And Cessation Intervention: Patient screened for tobacco use and is an ex/non-smoker (0) independent

## 2019-11-12 NOTE — ASU PREOP CHECKLIST - ANTIBIOTIC
Patient is having a shingles outbreak she wanted to know if it was safe for her to take valtrex while pregnant   n/a

## 2019-12-18 ENCOUNTER — APPOINTMENT (OUTPATIENT)
Dept: NEPHROLOGY | Facility: CLINIC | Age: 74
End: 2019-12-18

## 2020-09-09 NOTE — ASU PATIENT PROFILE, ADULT - PRO ARRIVE FROM
Pre-operative instructions, medication directives and pain scales reviewed with patient. All questions the patient had  were answered. Re-assurance about surgical procedure and day of surgery routine given as needed. The patient verbalized understanding of the pre-op instructions.   home

## 2020-11-26 NOTE — ASU PATIENT PROFILE, ADULT - PSH
Anesthesia Post-op Note    Patient: Annetta Brown  Procedure(s) Performed:      POD#: 1  Anesthesia type: General    Patient location: [facility] Surgical Floor    Vital Last Value   Temperature 37.3 °C (99.1 °F) (11/26/20 0006)   Pulse 93 (11/26/20 0006)   Respiratory Rate 17 (11/26/20 0006)   Non-Invasive   Blood Pressure 108/68 (11/26/20 0006)   Arterial  Blood Pressure     Pulse Oximetry 93 % (11/26/20 0006)     Last 24 I/O:     Intake/Output Summary (Last 24 hours) at 11/26/2020 0018  Last data filed at 11/25/2020 1533  Gross per 24 hour   Intake 1425.68 ml   Output 70 ml   Net 1355.68 ml     Post-op vital signs: stable  Level of consciousness: participates in exam    Post-op pain: Adequate analgesia  Nausea: None  Airway patency: patent  Respiratory: unassisted  Cardiovascular: stable and blood pressure at baseline  Hydration: euvolemic    Post-op assessment: no apparent anesthetic complications  Complications: None.    Comments: Please call  for any anesthesia related questions or concerns.   Elective surgery  heart recorder  H/O:  section    History of Mohs surgery for squamous cell carcinoma in situ of skin  nose  History of salpingo-oophorectomy

## 2021-06-07 NOTE — ASU PREOP CHECKLIST - ASSESSMENT, HISTORY & PHYSICAL COMPLETED AND ON MEDICAL RECORD
Tried calling the patient to schedule an appointment for the patient to be seen. Unable to leave a message because the phone number provided in the patients chart is currently disconnected.  JHOANA 06/07/2021
done/3/9

## 2022-04-01 NOTE — DISCHARGE NOTE ADULT - HOSPITAL COURSE
See other encounters regarding blood pressures     71F s/p WLE of R facial cutaneous SCC and R neck SLNB on 3/14 with Dr. Carter now here for nasal reconstruction with Dr. Elliott. Patient was taken to the OR on 3/21 for reconstruction with a paramedian forehead rotational flap. She was extubated in the OR and transferred to the PACU in stable condition 71F s/p WLE of R facial cutaneous SCC and R neck SLNB on 3/14 with Dr. Carter now here for nasal reconstruction with Dr. Elliott. Patient was taken to the OR on 3/21 for reconstruction with a paramedian forehead rotational flap. She was extubated in the OR and transferred to the PACU in stable condition and then transferred to SDU for further monitoring. q2h flap checks were performed and was stable overnight. The patient was tolerating a diet, ambulating, and voiced that she was ready for discharge. 71F s/p WLE of R facial cutaneous SCC and R neck SLNB on 3/14 with Dr. Carter now here for nasal reconstruction with Dr. Elliott. Patient was taken to the OR on 3/21 for reconstruction with a paramedian forehead rotational flap. She was extubated in the OR and transferred to the PACU in stable condition and then transferred to SDU for further monitoring. q2h flap checks were performed and was stable overnight. The patient was tolerating a diet, ambulating, and voiced that she was ready for discharge.     discharge exam  NAD  no increased work of breathing  suture line intact   minimal oozing from open wound  flap pink, good cap refill, warm

## 2023-07-19 NOTE — ASU PATIENT PROFILE, ADULT - PAIN SCALE PREFERRED, PROFILE
numerical 0-10 Reason To Defer Override: was quoted $500.00 for removal on the face, $250.00 for removal on the neck. Size Of Lesion In Cm (Optional): 0 Introduction Text (Please End With A Colon): The following procedure was deferred: shave biopsy to r/o SCC. pt is having heart sugery next week and cardiologist wants pt to perform shave biopsy after surgery to not interfere with clearance of surgery. Detail Level: Detailed Reason To Defer Override: was quoted $150.00 for 10 lesions removed.

## 2023-09-17 ENCOUNTER — INPATIENT (INPATIENT)
Facility: HOSPITAL | Age: 78
LOS: 1 days | Discharge: HOME | DRG: 439 | End: 2023-09-19
Attending: INTERNAL MEDICINE | Admitting: INTERNAL MEDICINE
Payer: MEDICARE

## 2023-09-17 VITALS
OXYGEN SATURATION: 100 % | HEIGHT: 62 IN | HEART RATE: 93 BPM | TEMPERATURE: 99 F | WEIGHT: 149.03 LBS | DIASTOLIC BLOOD PRESSURE: 63 MMHG | RESPIRATION RATE: 18 BRPM | SYSTOLIC BLOOD PRESSURE: 113 MMHG

## 2023-09-17 DIAGNOSIS — Z98.890 OTHER SPECIFIED POSTPROCEDURAL STATES: Chronic | ICD-10-CM

## 2023-09-17 DIAGNOSIS — Z90.79 ACQUIRED ABSENCE OF OTHER GENITAL ORGAN(S): Chronic | ICD-10-CM

## 2023-09-17 DIAGNOSIS — Z98.891 HISTORY OF UTERINE SCAR FROM PREVIOUS SURGERY: Chronic | ICD-10-CM

## 2023-09-17 DIAGNOSIS — Z41.9 ENCOUNTER FOR PROCEDURE FOR PURPOSES OTHER THAN REMEDYING HEALTH STATE, UNSPECIFIED: Chronic | ICD-10-CM

## 2023-09-17 DIAGNOSIS — H26.9 UNSPECIFIED CATARACT: Chronic | ICD-10-CM

## 2023-09-17 LAB
ACANTHOCYTES BLD QL SMEAR: SLIGHT — SIGNIFICANT CHANGE UP
ALBUMIN SERPL ELPH-MCNC: 2.3 G/DL — LOW (ref 3.3–5)
ALP SERPL-CCNC: 96 U/L — SIGNIFICANT CHANGE UP (ref 40–120)
ALT FLD-CCNC: 9 U/L — LOW (ref 12–78)
AMMONIA BLD-MCNC: 11 UMOL/L — SIGNIFICANT CHANGE UP (ref 11–32)
ANION GAP SERPL CALC-SCNC: 10 MMOL/L — SIGNIFICANT CHANGE UP (ref 5–17)
APPEARANCE UR: CLEAR — SIGNIFICANT CHANGE UP
AST SERPL-CCNC: 18 U/L — SIGNIFICANT CHANGE UP (ref 15–37)
BACTERIA # UR AUTO: ABNORMAL
BASOPHILS # BLD AUTO: 0 K/UL — SIGNIFICANT CHANGE UP (ref 0–0.2)
BASOPHILS NFR BLD AUTO: 0 % — SIGNIFICANT CHANGE UP (ref 0–2)
BILIRUB SERPL-MCNC: 0.9 MG/DL — SIGNIFICANT CHANGE UP (ref 0.2–1.2)
BILIRUB UR-MCNC: NEGATIVE — SIGNIFICANT CHANGE UP
BUN SERPL-MCNC: 31 MG/DL — HIGH (ref 7–23)
CALCIUM SERPL-MCNC: 9.2 MG/DL — SIGNIFICANT CHANGE UP (ref 8.5–10.1)
CHLORIDE SERPL-SCNC: 93 MMOL/L — LOW (ref 96–108)
CO2 SERPL-SCNC: 24 MMOL/L — SIGNIFICANT CHANGE UP (ref 22–31)
COLOR SPEC: YELLOW — SIGNIFICANT CHANGE UP
COMMENT - URINE: SIGNIFICANT CHANGE UP
CREAT SERPL-MCNC: 1.98 MG/DL — HIGH (ref 0.5–1.3)
DIFF PNL FLD: ABNORMAL
EGFR: 25 ML/MIN/1.73M2 — LOW
EOSINOPHIL # BLD AUTO: 0 K/UL — SIGNIFICANT CHANGE UP (ref 0–0.5)
EOSINOPHIL NFR BLD AUTO: 0 % — SIGNIFICANT CHANGE UP (ref 0–6)
EPI CELLS # UR: SIGNIFICANT CHANGE UP
GLUCOSE SERPL-MCNC: 293 MG/DL — HIGH (ref 70–99)
GLUCOSE UR QL: 1000 MG/DL
HCT VFR BLD CALC: 33.2 % — LOW (ref 34.5–45)
HGB BLD-MCNC: 11.5 G/DL — SIGNIFICANT CHANGE UP (ref 11.5–15.5)
HYALINE CASTS # UR AUTO: NEGATIVE /LPF — SIGNIFICANT CHANGE UP
KETONES UR-MCNC: ABNORMAL
LACTATE SERPL-SCNC: 1.9 MMOL/L — SIGNIFICANT CHANGE UP (ref 0.7–2)
LEUKOCYTE ESTERASE UR-ACNC: NEGATIVE — SIGNIFICANT CHANGE UP
LYMPHOCYTES # BLD AUTO: 0.38 K/UL — LOW (ref 1–3.3)
LYMPHOCYTES # BLD AUTO: 4 % — LOW (ref 13–44)
MAGNESIUM SERPL-MCNC: 1.9 MG/DL — SIGNIFICANT CHANGE UP (ref 1.6–2.6)
MANUAL SMEAR VERIFICATION: SIGNIFICANT CHANGE UP
MCHC RBC-ENTMCNC: 31.2 PG — SIGNIFICANT CHANGE UP (ref 27–34)
MCHC RBC-ENTMCNC: 34.6 GM/DL — SIGNIFICANT CHANGE UP (ref 32–36)
MCV RBC AUTO: 90 FL — SIGNIFICANT CHANGE UP (ref 80–100)
MONOCYTES # BLD AUTO: 0.87 K/UL — SIGNIFICANT CHANGE UP (ref 0–0.9)
MONOCYTES NFR BLD AUTO: 9 % — SIGNIFICANT CHANGE UP (ref 2–14)
NEUTROPHILS # BLD AUTO: 8.37 K/UL — HIGH (ref 1.8–7.4)
NEUTROPHILS NFR BLD AUTO: 85 % — HIGH (ref 43–77)
NEUTS BAND # BLD: 2 % — SIGNIFICANT CHANGE UP (ref 0–8)
NITRITE UR-MCNC: NEGATIVE — SIGNIFICANT CHANGE UP
NRBC # BLD: 0 /100 — SIGNIFICANT CHANGE UP (ref 0–0)
NRBC # BLD: SIGNIFICANT CHANGE UP /100 WBCS (ref 0–0)
NT-PROBNP SERPL-SCNC: 2949 PG/ML — HIGH (ref 0–450)
OVALOCYTES BLD QL SMEAR: SLIGHT — SIGNIFICANT CHANGE UP
PH UR: 5 — SIGNIFICANT CHANGE UP (ref 5–8)
PLAT MORPH BLD: NORMAL — SIGNIFICANT CHANGE UP
PLATELET # BLD AUTO: 296 K/UL — SIGNIFICANT CHANGE UP (ref 150–400)
POTASSIUM SERPL-MCNC: 3.4 MMOL/L — LOW (ref 3.5–5.3)
POTASSIUM SERPL-SCNC: 3.4 MMOL/L — LOW (ref 3.5–5.3)
PROT SERPL-MCNC: 6.3 GM/DL — SIGNIFICANT CHANGE UP (ref 6–8.3)
PROT UR-MCNC: 30 MG/DL
RAPID RVP RESULT: SIGNIFICANT CHANGE UP
RBC # BLD: 3.69 M/UL — LOW (ref 3.8–5.2)
RBC # FLD: 13.8 % — SIGNIFICANT CHANGE UP (ref 10.3–14.5)
RBC BLD AUTO: ABNORMAL
RBC CASTS # UR COMP ASSIST: SIGNIFICANT CHANGE UP /HPF (ref 0–4)
SARS-COV-2 RNA SPEC QL NAA+PROBE: SIGNIFICANT CHANGE UP
SODIUM SERPL-SCNC: 127 MMOL/L — LOW (ref 135–145)
SP GR SPEC: 1.01 — SIGNIFICANT CHANGE UP (ref 1.01–1.02)
TROPONIN I, HIGH SENSITIVITY RESULT: 6.79 NG/L — SIGNIFICANT CHANGE UP
UROBILINOGEN FLD QL: NEGATIVE — SIGNIFICANT CHANGE UP
WBC # BLD: 9.62 K/UL — SIGNIFICANT CHANGE UP (ref 3.8–10.5)
WBC # FLD AUTO: 9.62 K/UL — SIGNIFICANT CHANGE UP (ref 3.8–10.5)
WBC UR QL: NEGATIVE /HPF — SIGNIFICANT CHANGE UP (ref 0–5)

## 2023-09-17 PROCEDURE — 70450 CT HEAD/BRAIN W/O DYE: CPT | Mod: 26,MA

## 2023-09-17 PROCEDURE — 99285 EMERGENCY DEPT VISIT HI MDM: CPT | Mod: FS

## 2023-09-17 PROCEDURE — 71045 X-RAY EXAM CHEST 1 VIEW: CPT | Mod: 26

## 2023-09-17 PROCEDURE — 74176 CT ABD & PELVIS W/O CONTRAST: CPT | Mod: 26,MA

## 2023-09-17 PROCEDURE — 93010 ELECTROCARDIOGRAM REPORT: CPT

## 2023-09-17 RX ORDER — CEFTRIAXONE 500 MG/1
1000 INJECTION, POWDER, FOR SOLUTION INTRAMUSCULAR; INTRAVENOUS ONCE
Refills: 0 | Status: COMPLETED | OUTPATIENT
Start: 2023-09-17 | End: 2023-09-17

## 2023-09-17 RX ORDER — ACETAMINOPHEN 500 MG
1000 TABLET ORAL ONCE
Refills: 0 | Status: COMPLETED | OUTPATIENT
Start: 2023-09-17 | End: 2023-09-17

## 2023-09-17 RX ORDER — SODIUM CHLORIDE 9 MG/ML
2100 INJECTION INTRAMUSCULAR; INTRAVENOUS; SUBCUTANEOUS ONCE
Refills: 0 | Status: COMPLETED | OUTPATIENT
Start: 2023-09-17 | End: 2023-09-17

## 2023-09-17 RX ORDER — AZITHROMYCIN 500 MG/1
500 TABLET, FILM COATED ORAL ONCE
Refills: 0 | Status: COMPLETED | OUTPATIENT
Start: 2023-09-17 | End: 2023-09-17

## 2023-09-17 RX ADMIN — AZITHROMYCIN 255 MILLIGRAM(S): 500 TABLET, FILM COATED ORAL at 20:10

## 2023-09-17 RX ADMIN — SODIUM CHLORIDE 2100 MILLILITER(S): 9 INJECTION INTRAMUSCULAR; INTRAVENOUS; SUBCUTANEOUS at 17:43

## 2023-09-17 RX ADMIN — Medication 400 MILLIGRAM(S): at 17:42

## 2023-09-17 RX ADMIN — CEFTRIAXONE 1000 MILLIGRAM(S): 500 INJECTION, POWDER, FOR SOLUTION INTRAMUSCULAR; INTRAVENOUS at 20:10

## 2023-09-17 NOTE — ED PROVIDER NOTE - NS ED ATTENDING STATEMENT MOD
Attending Only This was a shared visit with the PAULINE. I reviewed and verified the documentation and independently performed the documented:

## 2023-09-17 NOTE — ED PROVIDER NOTE - NSICDXPASTMEDICALHX_GEN_ALL_CORE_FT
PAST MEDICAL HISTORY:  Cataracts, bilateral     Depression     Ectopic pregnancy     High cholesterol     Hypertension     Malignant melanoma     PAF (paroxysmal atrial fibrillation) with rapid vr    SCC (squamous cell carcinoma) nose    Surgery, elective tumor removal, nose, 3/14/2017    Umbilical hernia

## 2023-09-17 NOTE — ED PROVIDER NOTE - PHYSICAL EXAMINATION
PA NOTE: GEN: AOX3, NAD. HEENT: Throat clear. Airway is patent. EYES: PERRLA. EOMI. Head: NC/AT. NECK: Supple, No JVD. FROM. C-spine non-tender. CV:S1S2, RRR, LUNGS: Non-labored breathing, no tachypnea. O2sat 100% RA. CTA b/l. No w/r/r. CHEST: Equal chest expansion and rise. No deformity. ABD: Soft, +Mild diffuse tenderness. No rebound, no guarding. No CVAT. EXT: No e/c/c. 2+ distal pulses. SKIN: No rashes. NEURO: No focal deficits. CN II-XII intact. FROM. 5/5 motor and sensory. ~Luis Anderson PA-C

## 2023-09-17 NOTE — ED PROVIDER NOTE - NSICDXPASTSURGICALHX_GEN_ALL_CORE_FT
PAST SURGICAL HISTORY:  Cataracts, bilateral     Elective surgery heart recorder  removed 2018    H/O:  section     History of Mohs surgery for squamous cell carcinoma in situ of skin nose    History of salpingo-oophorectomy     Surgery, elective right eye corner reconstruction x 7

## 2023-09-17 NOTE — ED ADULT TRIAGE NOTE - AS HEIGHT TYPE
Ms. Austyn Pedersen is a 74 year old white female who is a resident of [facility] in [facility]. The patients primary care provider is Dr. Richard King. The patient has a past medical history of anemia, bipolar disorder, chronic pain, dementia, depression, extrapyramidal and movement disorder, glaucoma, hyperlipidemia, hypertension, scabies, E. Coli pyelonephritis, syncope, right nephrolithiasis, and acute on chronic diastolic congestive heart failure. The patient has a past surgical history of Cystoscopy with urethral stent placement to the right (9/27/18). While at the [facility] the patient had a syncopal episode while in bed this morning. The nurse at [facility] noted the patient was hypotensive with systolic b/p noted to be in the 70's per the ED notes. The patient was seen in this ED at [facility] earlier this week and is currently taking Cipro PO for pyelonephritis. The patient is under the care of Dr. Sorenson who  did the stent placement in September.  Per [facility] records the patient did receive her 1st dose of Cipro on yesterday, no difficulties were noted.  EMS was called by the [facility] staff; per the EMS records the patient received IV fluids and was noted to be awake and alert on arrival to the ED her at [facility]. The nurse from [facility] reported all b/p medications were held this morning due to patient being hypotensive. The patient states over the last 4 days she has had 4 episodes of vomiting. The patient reports she has not been getting enough to drink. The patient denies pain, chest pain, palpitations, shortness of breath, nausea, diarrhea, or  burning with urination. The patient also denies chills or body aches at this time. Per the ED notes the pateint received an additional 1.5 liters of NS IV fluids in the ED. The patient also received IV Rocephin 1 gram  for pyelonephritis per ED notes. SBP range in the  ED . Labs are remarkable for BUN--25, Cr--1.2, GFR--44, albumin--2.5, BNP---187, blood cultures were collected in the ED. CXR shows: Hypoventilatory exam, likely accounts for interstitial findings, no large focal consolidation. The pateint will be admitted to [facility] medicine service for further evaluation.                        stated

## 2023-09-17 NOTE — ED ADULT TRIAGE NOTE - CHIEF COMPLAINT QUOTE
Patient presents to the ER with complaints of weakness, poor appetite and hypotensive upon EMS arrival (80/47.) BGL with . Patient denies any pain, dizziness, syncope, N/V, fever.

## 2023-09-17 NOTE — ED PROVIDER NOTE - OBJECTIVE STATEMENT
78 year old female with a PMHx of BL cataracts, depression, HTN, HLD, ectopic pregnancy, malignant melanoma, A-fib on Amiodarone, SCC, umbilical hernia; presents to the ED c/o weakness and decreased PO intake. Patient [place] but was feeling some back pain, abdominal pain, and not feeling well; later on in the day she had some difficulty to eating and had an episode of vomiting but notes she drank a glass of wine. When she came back the following day she had onset weakness and dizziness, had a syncopal episode at dinner no falls and was caught. EtOH consumption 3x per week. Normal BM.

## 2023-09-17 NOTE — ED PROVIDER NOTE - CLINICAL SUMMARY MEDICAL DECISION MAKING FREE TEXT BOX
Patient with history of A-fib, HTN; presents to the ED with weakness, unable to get up today. Patient was found to be febrile, had episode of vomiting earlier in the week with abdominal pain. Plan IV antibiotics and admission. Patient with history of A-fib, HTN; presents to the ED with weakness, unable to get up today. Patient was found to be febrile, had episode of vomiting earlier in the week with abdominal pain. Plan IV antibiotics and admission.    CT shows pancreatitis with pseudocyst formation. Spoke with hospitalist dr. Anand, will admit patient. ~Luis Anderson PA-C

## 2023-09-17 NOTE — ED PROVIDER NOTE - PROGRESS NOTE DETAILS
PA: Patient is an 78 year old female with PMHx of b/l cataracts, depression, HTN, HLD, ectopic pregnancy, malignant melanoma, A-fib on Amiodarone, SCC, umbilical hernia who presents to ED c/o weakness, fever, and decreased PO intake. ~Luis Anderson PA-C COVID NEG. Waiting on CT. ~Luis Anderson PA-C CT shows pancreatitis with pseudocyst formation. Spoke with hospitalist dr. Anand, will admit patient. ~Luis Anderson PA-C

## 2023-09-18 DIAGNOSIS — J18.9 PNEUMONIA, UNSPECIFIED ORGANISM: ICD-10-CM

## 2023-09-18 LAB
A1C WITH ESTIMATED AVERAGE GLUCOSE RESULT: 7.4 % — HIGH (ref 4–5.6)
ADD ON TEST-SPECIMEN IN LAB: SIGNIFICANT CHANGE UP
ALBUMIN SERPL ELPH-MCNC: 2.1 G/DL — LOW (ref 3.3–5)
ALP SERPL-CCNC: 99 U/L — SIGNIFICANT CHANGE UP (ref 40–120)
ALT FLD-CCNC: 12 U/L — SIGNIFICANT CHANGE UP (ref 12–78)
ANION GAP SERPL CALC-SCNC: 9 MMOL/L — SIGNIFICANT CHANGE UP (ref 5–17)
APTT BLD: 25.2 SEC — SIGNIFICANT CHANGE UP (ref 24.5–35.6)
AST SERPL-CCNC: 30 U/L — SIGNIFICANT CHANGE UP (ref 15–37)
BASOPHILS # BLD AUTO: 0.09 K/UL — SIGNIFICANT CHANGE UP (ref 0–0.2)
BASOPHILS NFR BLD AUTO: 1 % — SIGNIFICANT CHANGE UP (ref 0–2)
BILIRUB SERPL-MCNC: 0.5 MG/DL — SIGNIFICANT CHANGE UP (ref 0.2–1.2)
BUN SERPL-MCNC: 24 MG/DL — HIGH (ref 7–23)
CALCIUM SERPL-MCNC: 8.6 MG/DL — SIGNIFICANT CHANGE UP (ref 8.5–10.1)
CHLORIDE SERPL-SCNC: 100 MMOL/L — SIGNIFICANT CHANGE UP (ref 96–108)
CO2 SERPL-SCNC: 22 MMOL/L — SIGNIFICANT CHANGE UP (ref 22–31)
CREAT SERPL-MCNC: 1.43 MG/DL — HIGH (ref 0.5–1.3)
EGFR: 37 ML/MIN/1.73M2 — LOW
EOSINOPHIL # BLD AUTO: 0.07 K/UL — SIGNIFICANT CHANGE UP (ref 0–0.5)
EOSINOPHIL NFR BLD AUTO: 0.8 % — SIGNIFICANT CHANGE UP (ref 0–6)
ESTIMATED AVERAGE GLUCOSE: 166 MG/DL — HIGH (ref 68–114)
GLUCOSE BLDC GLUCOMTR-MCNC: 111 MG/DL — HIGH (ref 70–99)
GLUCOSE BLDC GLUCOMTR-MCNC: 131 MG/DL — HIGH (ref 70–99)
GLUCOSE BLDC GLUCOMTR-MCNC: 157 MG/DL — HIGH (ref 70–99)
GLUCOSE BLDC GLUCOMTR-MCNC: 172 MG/DL — HIGH (ref 70–99)
GLUCOSE SERPL-MCNC: 143 MG/DL — HIGH (ref 70–99)
HCT VFR BLD CALC: 31.6 % — LOW (ref 34.5–45)
HGB BLD-MCNC: 11 G/DL — LOW (ref 11.5–15.5)
IMM GRANULOCYTES NFR BLD AUTO: 0.3 % — SIGNIFICANT CHANGE UP (ref 0–0.9)
INR BLD: 1.05 RATIO — SIGNIFICANT CHANGE UP (ref 0.85–1.18)
LIDOCAIN IGE QN: 127 U/L — HIGH (ref 13–75)
LYMPHOCYTES # BLD AUTO: 0.83 K/UL — LOW (ref 1–3.3)
LYMPHOCYTES # BLD AUTO: 9.3 % — LOW (ref 13–44)
MCHC RBC-ENTMCNC: 31.3 PG — SIGNIFICANT CHANGE UP (ref 27–34)
MCHC RBC-ENTMCNC: 34.8 GM/DL — SIGNIFICANT CHANGE UP (ref 32–36)
MCV RBC AUTO: 89.8 FL — SIGNIFICANT CHANGE UP (ref 80–100)
MONOCYTES # BLD AUTO: 1.12 K/UL — HIGH (ref 0–0.9)
MONOCYTES NFR BLD AUTO: 12.5 % — SIGNIFICANT CHANGE UP (ref 2–14)
NEUTROPHILS # BLD AUTO: 6.79 K/UL — SIGNIFICANT CHANGE UP (ref 1.8–7.4)
NEUTROPHILS NFR BLD AUTO: 76.1 % — SIGNIFICANT CHANGE UP (ref 43–77)
PLATELET # BLD AUTO: 312 K/UL — SIGNIFICANT CHANGE UP (ref 150–400)
POTASSIUM SERPL-MCNC: 3.1 MMOL/L — LOW (ref 3.5–5.3)
POTASSIUM SERPL-SCNC: 3.1 MMOL/L — LOW (ref 3.5–5.3)
PROT SERPL-MCNC: 5.9 GM/DL — LOW (ref 6–8.3)
PROTHROM AB SERPL-ACNC: 11.8 SEC — SIGNIFICANT CHANGE UP (ref 9.5–13)
RBC # BLD: 3.52 M/UL — LOW (ref 3.8–5.2)
RBC # FLD: 13.8 % — SIGNIFICANT CHANGE UP (ref 10.3–14.5)
SODIUM SERPL-SCNC: 131 MMOL/L — LOW (ref 135–145)
TROPONIN I, HIGH SENSITIVITY RESULT: 6.53 NG/L — SIGNIFICANT CHANGE UP
WBC # BLD: 8.93 K/UL — SIGNIFICANT CHANGE UP (ref 3.8–10.5)
WBC # FLD AUTO: 8.93 K/UL — SIGNIFICANT CHANGE UP (ref 3.8–10.5)

## 2023-09-18 PROCEDURE — 99222 1ST HOSP IP/OBS MODERATE 55: CPT

## 2023-09-18 PROCEDURE — 99223 1ST HOSP IP/OBS HIGH 75: CPT

## 2023-09-18 PROCEDURE — 36415 COLL VENOUS BLD VENIPUNCTURE: CPT

## 2023-09-18 PROCEDURE — 85610 PROTHROMBIN TIME: CPT

## 2023-09-18 PROCEDURE — 85027 COMPLETE CBC AUTOMATED: CPT

## 2023-09-18 PROCEDURE — 82962 GLUCOSE BLOOD TEST: CPT

## 2023-09-18 PROCEDURE — 80048 BASIC METABOLIC PNL TOTAL CA: CPT

## 2023-09-18 PROCEDURE — 83036 HEMOGLOBIN GLYCOSYLATED A1C: CPT

## 2023-09-18 PROCEDURE — 85730 THROMBOPLASTIN TIME PARTIAL: CPT

## 2023-09-18 PROCEDURE — 80053 COMPREHEN METABOLIC PANEL: CPT

## 2023-09-18 PROCEDURE — 84484 ASSAY OF TROPONIN QUANT: CPT

## 2023-09-18 PROCEDURE — 85025 COMPLETE CBC W/AUTO DIFF WBC: CPT

## 2023-09-18 RX ORDER — CEFTRIAXONE 500 MG/1
1000 INJECTION, POWDER, FOR SOLUTION INTRAMUSCULAR; INTRAVENOUS EVERY 24 HOURS
Refills: 0 | Status: DISCONTINUED | OUTPATIENT
Start: 2023-09-18 | End: 2023-09-19

## 2023-09-18 RX ORDER — POTASSIUM CHLORIDE 20 MEQ
0 PACKET (EA) ORAL
Qty: 0 | Refills: 0 | DISCHARGE

## 2023-09-18 RX ORDER — GLUCAGON INJECTION, SOLUTION 0.5 MG/.1ML
1 INJECTION, SOLUTION SUBCUTANEOUS ONCE
Refills: 0 | Status: DISCONTINUED | OUTPATIENT
Start: 2023-09-18 | End: 2023-09-19

## 2023-09-18 RX ORDER — DILTIAZEM HCL 120 MG
300 CAPSULE, EXT RELEASE 24 HR ORAL DAILY
Refills: 0 | Status: DISCONTINUED | OUTPATIENT
Start: 2023-09-18 | End: 2023-09-19

## 2023-09-18 RX ORDER — AMIODARONE HYDROCHLORIDE 400 MG/1
0 TABLET ORAL
Refills: 0 | DISCHARGE

## 2023-09-18 RX ORDER — DILTIAZEM HCL 120 MG
1 CAPSULE, EXT RELEASE 24 HR ORAL
Qty: 0 | Refills: 0 | DISCHARGE

## 2023-09-18 RX ORDER — DEXTROSE 50 % IN WATER 50 %
15 SYRINGE (ML) INTRAVENOUS ONCE
Refills: 0 | Status: DISCONTINUED | OUTPATIENT
Start: 2023-09-18 | End: 2023-09-19

## 2023-09-18 RX ORDER — INFLUENZA VIRUS VACCINE 15; 15; 15; 15 UG/.5ML; UG/.5ML; UG/.5ML; UG/.5ML
0.7 SUSPENSION INTRAMUSCULAR ONCE
Refills: 0 | Status: DISCONTINUED | OUTPATIENT
Start: 2023-09-18 | End: 2023-09-19

## 2023-09-18 RX ORDER — DEXTROSE 50 % IN WATER 50 %
12.5 SYRINGE (ML) INTRAVENOUS ONCE
Refills: 0 | Status: DISCONTINUED | OUTPATIENT
Start: 2023-09-18 | End: 2023-09-19

## 2023-09-18 RX ORDER — SODIUM CHLORIDE 9 MG/ML
1000 INJECTION INTRAMUSCULAR; INTRAVENOUS; SUBCUTANEOUS
Refills: 0 | Status: DISCONTINUED | OUTPATIENT
Start: 2023-09-18 | End: 2023-09-18

## 2023-09-18 RX ORDER — INSULIN LISPRO 100/ML
VIAL (ML) SUBCUTANEOUS AT BEDTIME
Refills: 0 | Status: DISCONTINUED | OUTPATIENT
Start: 2023-09-18 | End: 2023-09-19

## 2023-09-18 RX ORDER — DEXTROSE 50 % IN WATER 50 %
25 SYRINGE (ML) INTRAVENOUS ONCE
Refills: 0 | Status: DISCONTINUED | OUTPATIENT
Start: 2023-09-18 | End: 2023-09-19

## 2023-09-18 RX ORDER — ESOMEPRAZOLE MAGNESIUM 40 MG/1
40 CAPSULE, DELAYED RELEASE ORAL
Qty: 0 | Refills: 0 | DISCHARGE

## 2023-09-18 RX ORDER — POTASSIUM CHLORIDE 20 MEQ
40 PACKET (EA) ORAL ONCE
Refills: 0 | Status: COMPLETED | OUTPATIENT
Start: 2023-09-18 | End: 2023-09-18

## 2023-09-18 RX ORDER — SODIUM CHLORIDE 9 MG/ML
1000 INJECTION, SOLUTION INTRAVENOUS
Refills: 0 | Status: DISCONTINUED | OUTPATIENT
Start: 2023-09-18 | End: 2023-09-19

## 2023-09-18 RX ORDER — ONDANSETRON 8 MG/1
4 TABLET, FILM COATED ORAL EVERY 6 HOURS
Refills: 0 | Status: DISCONTINUED | OUTPATIENT
Start: 2023-09-18 | End: 2023-09-19

## 2023-09-18 RX ORDER — MORPHINE SULFATE 50 MG/1
1 CAPSULE, EXTENDED RELEASE ORAL EVERY 6 HOURS
Refills: 0 | Status: DISCONTINUED | OUTPATIENT
Start: 2023-09-18 | End: 2023-09-19

## 2023-09-18 RX ORDER — AMIODARONE HYDROCHLORIDE 400 MG/1
100 TABLET ORAL DAILY
Refills: 0 | Status: DISCONTINUED | OUTPATIENT
Start: 2023-09-18 | End: 2023-09-19

## 2023-09-18 RX ORDER — ASPIRIN/CALCIUM CARB/MAGNESIUM 324 MG
81 TABLET ORAL DAILY
Refills: 0 | Status: DISCONTINUED | OUTPATIENT
Start: 2023-09-18 | End: 2023-09-19

## 2023-09-18 RX ORDER — CHOLECALCIFEROL (VITAMIN D3) 125 MCG
1 CAPSULE ORAL
Qty: 0 | Refills: 0 | DISCHARGE

## 2023-09-18 RX ORDER — PREGABALIN 225 MG/1
1 CAPSULE ORAL
Qty: 0 | Refills: 0 | DISCHARGE

## 2023-09-18 RX ORDER — ENOXAPARIN SODIUM 100 MG/ML
40 INJECTION SUBCUTANEOUS EVERY 24 HOURS
Refills: 0 | Status: DISCONTINUED | OUTPATIENT
Start: 2023-09-18 | End: 2023-09-19

## 2023-09-18 RX ORDER — PANTOPRAZOLE SODIUM 20 MG/1
40 TABLET, DELAYED RELEASE ORAL
Refills: 0 | Status: DISCONTINUED | OUTPATIENT
Start: 2023-09-18 | End: 2023-09-19

## 2023-09-18 RX ORDER — INSULIN LISPRO 100/ML
VIAL (ML) SUBCUTANEOUS
Refills: 0 | Status: DISCONTINUED | OUTPATIENT
Start: 2023-09-18 | End: 2023-09-19

## 2023-09-18 RX ADMIN — SODIUM CHLORIDE 100 MILLILITER(S): 9 INJECTION INTRAMUSCULAR; INTRAVENOUS; SUBCUTANEOUS at 11:29

## 2023-09-18 RX ADMIN — Medication 300 MILLIGRAM(S): at 15:56

## 2023-09-18 RX ADMIN — Medication 10 MILLIGRAM(S): at 15:58

## 2023-09-18 RX ADMIN — Medication 40 MILLIEQUIVALENT(S): at 11:27

## 2023-09-18 RX ADMIN — Medication 81 MILLIGRAM(S): at 15:59

## 2023-09-18 RX ADMIN — CEFTRIAXONE 1000 MILLIGRAM(S): 500 INJECTION, POWDER, FOR SOLUTION INTRAMUSCULAR; INTRAVENOUS at 05:49

## 2023-09-18 RX ADMIN — ENOXAPARIN SODIUM 40 MILLIGRAM(S): 100 INJECTION SUBCUTANEOUS at 11:28

## 2023-09-18 RX ADMIN — Medication 1: at 11:57

## 2023-09-18 RX ADMIN — Medication 10 MILLIGRAM(S): at 21:32

## 2023-09-18 RX ADMIN — PANTOPRAZOLE SODIUM 40 MILLIGRAM(S): 20 TABLET, DELAYED RELEASE ORAL at 15:59

## 2023-09-18 RX ADMIN — AMIODARONE HYDROCHLORIDE 100 MILLIGRAM(S): 400 TABLET ORAL at 15:54

## 2023-09-18 NOTE — H&P ADULT - HISTORY OF PRESENT ILLNESS
77 y/o F w/ PMH of depression, HTN, malignant melanoma, paroxysmal A-fib, SCC, umbilical hernia, pancreatic cyst s/p surgery, p/w not feeling well. Patient states since Thursday she has not been feeling well. States the following two days she was sleeping alot. Prior to arrival, patient states she went to lunch with her , and then when sitting at the restaurant, she suddenly felt very weak and put her elbows on the table, became diophoertic and felt like she was going to almost pass out. However, denies having LOC. States EMS was called, and she was told her BP was low. At the time patient also had some lower abdominal pain as well, which has since resolved. Patient states she has been noticing that her abdomen has been distended over the last few months and that she has not been eating much, but denies weight loss. Patient had two episodes of non-bloody vomiting on Thursday, but not vomiting since.      Patient also states that last  she had surgery for a cyst on her pancreatic tail, and part of her pancreas and spleen were removed. She had a MRI in July following the surgery, and she was told that everything looked fine at the time       PSH: cataract surgery, , mohs surgery, salpingo-oophorectomy, R eye surgery    Social Hx: Denies tobacco / etoh / drugs     Family Hx: Denies pertinent hx

## 2023-09-18 NOTE — CONSULT NOTE ADULT - SUBJECTIVE AND OBJECTIVE BOX
HPI:  79 y/o F w/ PMH of depression, HTN, malignant melanoma, paroxysmal A-fib, SCC, umbilical hernia, pancreatic cyst s/p surgery, p/w not feeling well. Patient states since Thursday she has not been feeling well. States the following two days she was sleeping alot. Prior to arrival, patient states she went to lunch with her , and then when sitting at the restaurant, she suddenly felt very weak and put her elbows on the table, became diophoertic and felt like she was going to almost pass out. However, denies having LOC. States EMS was called, and she was told her BP was low. At the time patient also had some lower abdominal pain as well, which has since resolved. Patient states she has been noticing that her abdomen has been distended over the last few months and that she has not been eating much, but denies weight loss. Patient had two episodes of non-bloody vomiting on Thursday, but not vomiting since.      Patient also states that last  she had surgery for a cyst on her pancreatic tail, and part of her pancreas and spleen were removed. She had a MRI in July following the surgery, and she was told that everything looked fine at the time       PSH: cataract surgery, , mohs surgery, salpingo-oophorectomy, R eye surgery    Social Hx: Denies tobacco / etoh / drugs     Family Hx: Denies pertinent hx  (18 Sep 2023 04:09)    79 yo female with PMH as above admitted for feeling unwell and found to have a large pancreatic cyst. Urology consulted for incidental finding of 0.9 cm hypodense renal lesion in Right lower pole on CT. Pt seen at bedside reports she is feeling better and would like to eat. She states she has chronic low back pain but denies any flank pain, fevers, chills, dysuria, hematuria.     PAST MEDICAL & SURGICAL HISTORY:  SCC (squamous cell carcinoma)  nose      Hypertension      PAF (paroxysmal atrial fibrillation)  with rapid vr      Malignant melanoma      Cataracts, bilateral      High cholesterol      Umbilical hernia      Ectopic pregnancy      Surgery, elective  tumor removal, nose, 3/14/2017      Depression      History of salpingo-oophorectomy      H/O:  section      History of Mohs surgery for squamous cell carcinoma in situ of skin  nose      Elective surgery  heart recorder  removed 2018      Surgery, elective  right eye corner reconstruction x 7      Cataracts, bilateral          REVIEW OF SYSTEMS  All other review of systems neg, except as noted in HPI          MEDICATIONS  (STANDING):  cefTRIAXone Injectable. 1000 milliGRAM(s) IV Push every 24 hours  dextrose 5%. 1000 milliLiter(s) (100 mL/Hr) IV Continuous <Continuous>  dextrose 5%. 1000 milliLiter(s) (50 mL/Hr) IV Continuous <Continuous>  dextrose 50% Injectable 25 Gram(s) IV Push once  dextrose 50% Injectable 25 Gram(s) IV Push once  dextrose 50% Injectable 12.5 Gram(s) IV Push once  enoxaparin Injectable 40 milliGRAM(s) SubCutaneous every 24 hours  glucagon  Injectable 1 milliGRAM(s) IntraMuscular once  influenza  Vaccine (HIGH DOSE) 0.7 milliLiter(s) IntraMuscular once  insulin lispro (ADMELOG) corrective regimen sliding scale   SubCutaneous at bedtime  insulin lispro (ADMELOG) corrective regimen sliding scale   SubCutaneous three times a day before meals  potassium chloride   Powder 40 milliEquivalent(s) Oral once  sodium chloride 0.9%. 1000 milliLiter(s) (100 mL/Hr) IV Continuous <Continuous>    MEDICATIONS  (PRN):  dextrose Oral Gel 15 Gram(s) Oral once PRN Blood Glucose LESS THAN 70 milliGRAM(s)/deciliter  morphine  - Injectable 1 milliGRAM(s) IV Push every 6 hours PRN Severe Pain (7 - 10)  ondansetron Injectable 4 milliGRAM(s) IV Push every 6 hours PRN Nausea and/or Vomiting      Allergies    No Known Allergies    Intolerances        SOCIAL HISTORY:    FAMILY HISTORY:      Vital Signs Last 24 Hrs  T(C): 37.2 (18 Sep 2023 08:22), Max: 38.8 (17 Sep 2023 17:15)  T(F): 98.9 (18 Sep 2023 08:22), Max: 101.9 (17 Sep 2023 17:15)  HR: 94 (18 Sep 2023 08:22) (66 - 96)  BP: 120/52 (18 Sep 2023 08:22) (92/54 - 143/62)  BP(mean): 77 (18 Sep 2023 01:06) (64 - 90)  RR: 18 (18 Sep 2023 08:22) (16 - 21)  SpO2: 94% (18 Sep 2023 08:22) (93% - 100%)    Parameters below as of 18 Sep 2023 08:22  Patient On (Oxygen Delivery Method): room air        PHYSICAL EXAM:       General: No distress, No anxiety  VITALS  T(C): 37.2 (23 @ 08:22), Max: 38.8 (23 @ 17:15)  HR: 94 (23 @ 08:22) (66 - 96)  BP: 120/52 (23 @ 08:22) (92/54 - 143/62)  RR: 18 (23 @ 08:22) (16 - 21)  SpO2: 94% (23 @ 08:22) (93% - 100%)     Lung    : No resp distress  Abdo:   : Soft, Non tender, No guarding, No distension   Back    : No CVAT b/l  Genitalia Female: No Corona         LABS:                        11.0   8.93  )-----------( 312      ( 18 Sep 2023 06:51 )             31.6         131<L>  |  100  |  24<H>  ----------------------------<  143<H>  3.1<L>   |  22  |  1.43<H>    Ca    8.6      18 Sep 2023 06:51  Mg     1.9         TPro  5.9<L>  /  Alb  2.1<L>  /  TBili  0.5  /  DBili  x   /  AST  30  /  ALT  12  /  AlkPhos  99  -18    PT/INR - ( 18 Sep 2023 06:51 )   PT: 11.8 sec;   INR: 1.05 ratio         PTT - ( 18 Sep 2023 06:51 )  PTT:25.2 sec  Urinalysis Basic - ( 18 Sep 2023 06:51 )    Color: x / Appearance: x / SG: x / pH: x  Gluc: 143 mg/dL / Ketone: x  / Bili: x / Urobili: x   Blood: x / Protein: x / Nitrite: x   Leuk Esterase: x / RBC: x / WBC x   Sq Epi: x / Non Sq Epi: x / Bacteria: x        RADIOLOGY & ADDITIONAL STUDIES:< from: CT Abdomen and Pelvis w/ Oral Cont (23 @ 22:42) >    ACC: [id number] EXAM:  CT ABDOMEN AND PELVIS OC   ORDERED BY: TOAN ALI     PROCEDURE DATE:  2023          INTERPRETATION:  CLINICAL INFORMATION: Abdominal pain and fever.    COMPARISON: None.    CONTRAST/COMPLICATIONS:  IV Contrast: NONE  OralContrast: Omnipaque 300  Complications: None reported at time of study completion    PROCEDURE:  CT of the Abdomen and Pelvis was performed.  Sagittal and coronal reformats were performed.    FINDINGS:  LOWER CHEST: Trace to small left pleural effusion with partial   compressive atelectasis of the lower lobe. Right basilar dependent   atelectasis.    LIVER: Within normal limits.  BILE DUCTS: Normal caliber.  GALLBLADDER: Partially contracted. No pericholecystic fluid or   inflammatory changes. Nogross radiopaque stones.  SPLEEN: Not visualized.  PANCREAS: Head and, neck and proximal body are unremarkable. The distal   body and tail are replaced by a large air-containing cystic collection   with peripheral calcifications measuring 11.5 x 11.8x 12.4 cm. This   collection is inseparable from the greater curvature of the stomach and   projects into the left upper quadrant. Nonspecific stranding in the fat   adjacent to this collection. No main duct dilatation. No acute   inflammatory changes. ADRENALS: Within normal limits.  KIDNEYS/URETERS: No hydronephrosis or nephrolithiasis. Nonspecific   moderate perinephric stranding. 0.9 cm hypodense lesion lower pole right   kidney. Small left renal cyst.    BLADDER: Within normal limits.  REPRODUCTIVE ORGANS: No pelvic soft tissue mass.    BOWEL: No bowel obstruction. Appendix is normal. Colonic diverticulosis.   No focal bowel wall thickening or inflammatory changes.  PERITONEUM: No ascites.  VESSELS: Within normal limits.  RETROPERITONEUM/LYMPH NODES: No lymphadenopathy.  ABDOMINAL WALL: Within normal limits.  BONES: Degenerative changes. Chronic left lower rib fractures. No acute   osseous finding.    IMPRESSION:  Large air-containing heterogeneous cystic collection with peripheral   calcifications occupying the distal body and tail of the pancreas and   inseparable from the stomach presumably sequela of necrotizing   pancreatitis and pseudocyst formation. Air may be from superimposed   infection with +/- presence of gastric fistula. Spleen not visualized;   please correlate clinically for prior splenectomy    Nonspecific moderate perinephric stranding without hydronephrosis.   Subcentimeter indeterminate hyperdense right renal lesion presumably a   hemorrhagic or proteinaceous cyst. This can be further characterized with   renal sonogram as clinically warranted.    --- End of Report ---            JONO FISHER MD; Attending Radiologist  This document has been electronically signed. Sep 18 2023 12:01AM    < end of copied text >

## 2023-09-18 NOTE — CONSULT NOTE ADULT - ASSESSMENT
78 year old female with history panc tail cyst with resection and splenectomy 7/2022 with cystic collection with calcifications on CT    Imp: Pancreatic pseudocyst with calcifications    Rec:  ::Recommended cyst gastrostomy and discussed with patient--->declined any procedures until she sees her surgeon, Dr. Phani Mtz on Wednesday  
78F with multiple medical comorbidities presents with abdominal pain one year after distal pancreatectomy and splenectomy  found to have large collection with air surrounding the distal pancreas abutting the stomach    recommend GI evaluation  pt will likely need drainage of this collection  recommend obtaining images from last MRI/CT to compare  no acute surgical intervention indicated at this time    Plan discussed with Dr. Mendez
  77 yo female with PMH as above admitted for feeling unwell and found to have a large pancreatic cyst. Urology consulted for incidental finding of 0.9 cm hypodense renal lesion in Right lower pole on CT. H/H stable. Discussed CT findings with pt. Pt prefers to follow up with a urologist at [facility].  Recommend  - MRI renal mass protocol once patient's creat trends down- can be done outpatient   - Patient should follow up with a urologist of her choice or Dr. Valdez outpatient for further imaging/work up of right renal lesion    Case discussed with Dr. Valdez

## 2023-09-18 NOTE — CHART NOTE - NSCHARTNOTEFT_GEN_A_CORE
pt refused further eval and wants to go to [facility] to her GI surgeon. keep over night dc in am if stable  dc ivf
pt adm today  seen and exam  order MRCP  + diarrhea watery for 2 weeks

## 2023-09-18 NOTE — CONSULT NOTE ADULT - SUBJECTIVE AND OBJECTIVE BOX
77 y/o F w/ PMH of depression, HTN, malignant melanoma, paroxysmal A-fib, SCC, umbilical hernia, pancreatic cyst s/p surgery, p/w not feeling well. Patient states since Thursday she has not been feeling well. States the following two days she was sleeping alot. Prior to arrival, patient states she went to lunch with her , and then when sitting at the restaurant, she suddenly felt very weak and put her elbows on the table, became diophoertic and felt like she was going to almost pass out. However, denies having LOC. States EMS was called, and she was told her BP was low. At the time patient also had some lower abdominal pain as well, which has since resolved. Patient states she has been noticing that her abdomen has been distended over the last few months and that she has not been eating much, but denies weight loss. Patient had two episodes of non-bloody vomiting on Thursday, but not vomiting since.      Patient also states that last  she had a distal pancreatectomy and splenectomy for a cyst on her pancreatic tail.  She had a MRI in July following the surgery, and she was told that everything looked fine at the time.     PSH: cataract surgery, , mohs surgery, salpingo-oophorectomy, R eye surgery    Social Hx: Denies tobacco / etoh / drugs     Family Hx: Denies pertinent hx     Pt seen and examined at bedside, no acute events. Pt complained of vague epigastric pain. Tolerating regular diet. Having diarrhea. Denied fever, chills, nausea, vomiting or SOB overnight.     Vital Signs Last 24 Hrs  T(C): 37.2 (18 Sep 2023 08:22), Max: 38.8 (17 Sep 2023 17:15)  T(F): 98.9 (18 Sep 2023 08:22), Max: 101.9 (17 Sep 2023 17:15)  HR: 94 (18 Sep 2023 08:22) (66 - 96)  BP: 120/52 (18 Sep 2023 08:22) (92/54 - 143/62)  BP(mean): 77 (18 Sep 2023 01:06) (64 - 90)  RR: 18 (18 Sep 2023 08:22) (16 - 21)  SpO2: 94% (18 Sep 2023 08:22) (93% - 100%)    Parameters below as of 18 Sep 2023 08:22  Patient On (Oxygen Delivery Method): room air                            11.0   8.93  )-----------( 312      ( 18 Sep 2023 06:51 )             31.6     09-18    131<L>  |  100  |  24<H>  ----------------------------<  143<H>  3.1<L>   |  22  |  1.43<H>    Ca    8.6      18 Sep 2023 06:51  Mg     1.9         TPro  5.9<L>  /  Alb  2.1<L>  /  TBili  0.5  /  DBili  x   /  AST  30  /  ALT  12  /  AlkPhos  99      I&O's Summary        Physical Exam:  Pt is AAOx3  General: Well developed, in no acute distress.   Chest: Lungs clear, no rales, no rhonchi, no wheezes.   Heart: RR, no murmurs, no rubs, no gallops.   Abdomen: Soft, epigastric tenderness to palpation, nondistended, no masses, BS normal.    Back: Normal curvature, no tenderness.   Neuro: Physiological, no localizing findings.   Skin: Normal, no rashes, no lesions noted.   Extremities: Warm, well perfused, no edema, Pulses intact

## 2023-09-18 NOTE — H&P ADULT - ASSESSMENT
79 y/o F w/ PMH of depression, HTN, malignant melanoma, paroxysmal A-fib, SCC, umbilical hernia, pancreatic cyst s/p surgery, p/w not feeling well    *Back pain / abdominal pain / vomiting  -CT Abd: Large air-containing heterogeneous cystic collection with peripheral calcifications occupying the distal body and tail of the pancreas and  inseparable from the stomach presumably sequela of necrotizing pancreatitis and pseudocyst formation. Air may be from superimposed infection with +/- presence of gastric fistula  -GI consult  -Surgery consult  -IVF  -NPO for now    *Near-syncope  -Tele monitoring  -Check orthostatics  -Echo  -Troponins negative x 1   -CTH: no acute findings   -Likely 2/2 underlying illness  -IVF  -F/u outpatient neuro / cardio    *Questionable pyelonephririts?  -CT: Nonspecific moderate perinephric stranding without hydronephrosis.  Subcentimeter indeterminate hyperdense right renal lesion presumably a  hemorrhagic or proteinaceous cyst  -Even though CT has perinephric stranding, UA negative for UTI.  -Ceftriaxone  - consult     *Suspect XENIA vs CKD?  -Baseline creatinine unknown  -Avoid nephrotoxic agents  -IVF  -Trend creatinine after IVF to check for improvement     *A-fib  -Patient is not sure about all the medications she's on, states her  will bring the list in AM    *DM2  -Humalog ISS  -Diabetic diet    *Hypnatremia  -Recheck in AM s/p IVF     *H/o depression / malignant melanoma / SCC / anemia   -F/u outpatient    *Medication reconcilliation  -Patient states she doesn't know names or doses of all her medications, and only remembers that she takes half a pill of amiodarone, but doesn't remember doseage for that as well. Called family, but no answer at this time. Patient states her  will be bringing the list in the morning     *DVT ppx  -SCDs 2/2 questionable hemorrhagic cyst on CT abd 77 y/o F w/ PMH of depression, HTN, malignant melanoma, paroxysmal A-fib, SCC, umbilical hernia, pancreatic cyst s/p surgery, p/w not feeling well    *Abdominal pain / vomiting / decreased oral intake  -CT Abd: Large air-containing heterogeneous cystic collection with peripheral calcifications occupying the distal body and tail of the pancreas and  inseparable from the stomach presumably sequela of necrotizing pancreatitis and pseudocyst formation. Air may be from superimposed infection with +/- presence of gastric fistula  -GI consult  -Surgery consult  -s/p IVF    *Near-syncope  -Tele monitoring  -Check orthostatics  -Echo  -Troponins negative x 1   -CTH: no acute findings   -Likely 2/2 underlying illness  -S/p IVF  -F/u outpatient neuro / cardio    *Questionable pyelonephririts?  -CT: Nonspecific moderate perinephric stranding without hydronephrosis.  Subcentimeter indeterminate hyperdense right renal lesion presumably a  hemorrhagic or proteinaceous cyst  -Even though CT has perinephric stranding, UA negative for UTI.  -Ceftriaxone  - consult     *Suspect XENIA vs CKD?  -Baseline creatinine unknown  -Avoid nephrotoxic agents  -S/p IVF  -Trend creatinine in AM to check for improvement     *A-fib  -Patient is not sure about all the medications she's on, states her  will bring the list in AM    *DM2  -Humalog ISS  -Diabetic diet    *Hypnatremia  -Recheck in AM s/p IVF     *H/o depression / malignant melanoma / SCC / anemia   -F/u outpatient    *Medication reconcilliation  -Patient states she doesn't know names or doses of all her medications, and only remembers that she takes half a pill of amiodarone, but doesn't remember doseage for that as well. Called family, but no answer at this time. Patient states her  will be bringing the list in the morning     *DVT ppx  -SCDs 2/2 questionable hemorrhagic cyst on CT abd   77 y/o F w/ PMH of depression, HTN, malignant melanoma, paroxysmal A-fib, SCC, umbilical hernia, pancreatic cyst s/p surgery, p/w not feeling well    *Abdominal pain / vomiting / decreased oral intake  -CT Abd: Large air-containing heterogeneous cystic collection with peripheral calcifications occupying the distal body and tail of the pancreas and  inseparable from the stomach presumably sequela of necrotizing pancreatitis and pseudocyst formation. Air may be from superimposed infection with +/- presence of gastric fistula  -GI consult  -Surgery consult  -s/p IVF    *Near-syncope  -Tele monitoring  -Check orthostatics  -Echo  -EKG: NSR 91 bpm   -Troponins negative x 1   -CTH: no acute findings   -Likely 2/2 underlying illness  -S/p IVF  -F/u outpatient neuro / cardio    *Questionable pyelonephririts?  -CT: Nonspecific moderate perinephric stranding without hydronephrosis.  Subcentimeter indeterminate hyperdense right renal lesion presumably a  hemorrhagic or proteinaceous cyst  -Even though CT has perinephric stranding, UA negative for UTI.  -Ceftriaxone  - consult     *Suspect XENIA vs CKD?  -Baseline creatinine unknown  -Avoid nephrotoxic agents  -S/p IVF  -Trend creatinine in AM to check for improvement     *Paroxysmal A-fib  -Patient is not sure about all the medications she's on, states her  will bring the list in AM    *DM2  -Humalog ISS  -Diabetic diet    *Hypnatremia  -Recheck in AM s/p IVF     *H/o depression / malignant melanoma / SCC / anemia   -F/u outpatient    *Medication reconcilliation  -Patient states she doesn't know names or doses of all her medications, and only remembers that she takes half a pill of amiodarone, but doesn't remember doseage for that as well. Called family, but no answer at this time. Patient states her  will be bringing the list in the morning     *DVT ppx  -SCDs 2/2 questionable hemorrhagic cyst on CT abd

## 2023-09-18 NOTE — CONSULT NOTE ADULT - NS ATTEND AMEND GEN_ALL_CORE FT
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 Found to have pseudocyst in tail of pancreas, infected vs persistent fistula.     Plan for endoscopic evaluation, drainage, necrosectomy, or cyst gastrostomy but patient refusing any intervention and wishes to follow up with her pancreatic surgeon this week.

## 2023-09-18 NOTE — CONSULT NOTE ADULT - SUBJECTIVE AND OBJECTIVE BOX
Patient is a 78y old  Female who presents with a chief complaint of not feeling well    HPI:  This is an 78 year old female with significant past medical history of depression, HTN, malignant melanoma, paroxysmal A-fib, SCC, umbilical hernia, pancreatic cyst s/p surgery 2022 presenting to [facility] with near syncope and general "not feeling well." Per patient she has been feeling fatigued, limited appetite, with intermittent abdominal pain localized to epigastric region radiating across upper quadrants. Denies pain currently. But does report that she has had nausea and episode non-bloody vomiting with abdominal pain. Was out to lunch with spouse and suddenly became diaphoretic whilest sitting at table and felt as though she was going go pass out. Apparently hypotensive per EMS. She does endorse abdominal distension over past several months. Denies weight loss. Denies any overt signs of GIB including hematemesis, hematochezia, melena, or CGE. Had resection of portion of pancreatic tail for cyst and splenectomy by Dr. Phani Mtz at [place] in 2022. Per patient subsequent post procedure MRI's have all be unremarkable with most recent this past July and has scheduled followup with Dr. Mtz this  along with 2 other appointments with physicians in [facility]. Patient currently being treated for pneumonia. CT findings here consistent with cystic fluid collection with calcifications in pancreas.    PAST MEDICAL & SURGICAL HISTORY:  SCC (squamous cell carcinoma)  nose      Hypertension      PAF (paroxysmal atrial fibrillation)  with rapid vr      Malignant melanoma      Cataracts, bilateral      High cholesterol      Umbilical hernia      Ectopic pregnancy      Surgery, elective  tumor removal, nose, 3/14/2017      Depression      History of salpingo-oophorectomy      H/O:  section      History of Mohs surgery for squamous cell carcinoma in situ of skin  nose      Elective surgery  heart recorder  removed 2018      Surgery, elective  right eye corner reconstruction x 7      Cataracts, bilateral          MEDICATIONS  (STANDING):  aMIOdarone    Tablet 100 milliGRAM(s) Oral daily  aspirin enteric coated 81 milliGRAM(s) Oral daily  busPIRone 10 milliGRAM(s) Oral two times a day  cefTRIAXone Injectable. 1000 milliGRAM(s) IV Push every 24 hours  dextrose 5%. 1000 milliLiter(s) (100 mL/Hr) IV Continuous <Continuous>  dextrose 5%. 1000 milliLiter(s) (50 mL/Hr) IV Continuous <Continuous>  dextrose 50% Injectable 25 Gram(s) IV Push once  dextrose 50% Injectable 12.5 Gram(s) IV Push once  dextrose 50% Injectable 25 Gram(s) IV Push once  diltiazem    milliGRAM(s) Oral daily  enoxaparin Injectable 40 milliGRAM(s) SubCutaneous every 24 hours  glucagon  Injectable 1 milliGRAM(s) IntraMuscular once  influenza  Vaccine (HIGH DOSE) 0.7 milliLiter(s) IntraMuscular once  insulin lispro (ADMELOG) corrective regimen sliding scale   SubCutaneous three times a day before meals  insulin lispro (ADMELOG) corrective regimen sliding scale   SubCutaneous at bedtime  pantoprazole    Tablet 40 milliGRAM(s) Oral before breakfast    MEDICATIONS  (PRN):  dextrose Oral Gel 15 Gram(s) Oral once PRN Blood Glucose LESS THAN 70 milliGRAM(s)/deciliter  morphine  - Injectable 1 milliGRAM(s) IV Push every 6 hours PRN Severe Pain (7 - 10)  ondansetron Injectable 4 milliGRAM(s) IV Push every 6 hours PRN Nausea and/or Vomiting      Allergies    No Known Allergies    Intolerances        SOCIAL HISTORY:    FAMILY HISTORY:      REVIEW OF SYSTEMS:    CONSTITUTIONAL: No weakness, fevers or chills  EYES/ENT: No visual changes;  No vertigo or throat pain   NECK: No pain or stiffness  RESPIRATORY: No cough, wheezing, hemoptysis; No shortness of breath  CARDIOVASCULAR: No chest pain or palpitations  GASTROINTESTINAL: See HPI  GENITOURINARY: No dysuria, frequency or hematuria  NEUROLOGICAL: No numbness or weakness  SKIN: No itching, burning, rashes, or lesions   PSYCH: Normal mood and affect  All other review of systems is negative unless indicated above.    Vital Signs Last 24 Hrs  T(C): 36.8 (18 Sep 2023 15:16), Max: 38.8 (17 Sep 2023 17:15)  T(F): 98.2 (18 Sep 2023 15:16), Max: 101.9 (17 Sep 2023 17:15)  HR: 77 (18 Sep 2023 15:16) (66 - 96)  BP: 113/67 (18 Sep 2023 15:16) (92/54 - 143/62)  BP(mean): 77 (18 Sep 2023 01:06) (64 - 90)  RR: 18 (18 Sep 2023 15:16) (16 - 21)  SpO2: 96% (18 Sep 2023 15:16) (93% - 100%)    Parameters below as of 18 Sep 2023 15:16  Patient On (Oxygen Delivery Method): room air        PHYSICAL EXAM:    Constitutional: No acute distress, well-developed, non-toxic appearing  HEENT: masked, good phonation, not icteric  Neck: supple, no lymphadenopathy  Respiratory: decreased to ascultation bilaterally, no wheezing  Cardiovascular: S1 and S2, regular rate and rhythm, no murmurs rubs or gallops  Gastrointestinal: soft, non-tender, protuberant due to habitus, +bowel sounds, no rebound or guarding, no surgical scars, no drains  Extremities: No peripheral edema, no cyanosis or clubbing  Vascular: 2+ peripheral pulses, no venous stasis  Neurological: A/O x 3, no focal deficits, no asterixis  Psychiatric: Normal mood, normal affect  Skin: No rashes, not jaundiced    LABS:                        11.0   8.93  )-----------( 312      ( 18 Sep 2023 06:51 )             31.6     09-18    131<L>  |  100  |  24<H>  ----------------------------<  143<H>  3.1<L>   |  22  |  1.43<H>    Ca    8.6      18 Sep 2023 06:51  Mg     1.9     09-17    TPro  5.9<L>  /  Alb  2.1<L>  /  TBili  0.5  /  DBili  x   /  AST  30  /  ALT  12  /  AlkPhos  99  09-18    PT/INR - ( 18 Sep 2023 06:51 )   PT: 11.8 sec;   INR: 1.05 ratio         PTT - ( 18 Sep 2023 06:51 )  PTT:25.2 sec  LIVER FUNCTIONS - ( 18 Sep 2023 06:51 )  Alb: 2.1 g/dL / Pro: 5.9 gm/dL / ALK PHOS: 99 U/L / ALT: 12 U/L / AST: 30 U/L / GGT: x             RADIOLOGY & ADDITIONAL STUDIES:  IMPRESSION:  Large air-containing heterogeneous cystic collection with peripheral   calcifications occupying the distal body and tail of the pancreas and   inseparable from the stomach presumably sequela of necrotizing   pancreatitis and pseudocyst formation. Air may be from superimposed   infection with +/- presence of gastric fistula. Spleen not visualized;   please correlate clinically for prior splenectomy    Nonspecific moderate perinephric stranding without hydronephrosis.   Subcentimeter indeterminate hyperdense right renal lesion presumably a   hemorrhagic or proteinaceous cyst. This can be further characterized with   renal sonogram as clinically warranted.   Patient is a 78y old  Female who presents with a chief complaint of not feeling well      This is an 78 year old female with significant past medical history of depression, HTN, malignant melanoma, paroxysmal A-fib, SCC, umbilical hernia, pancreatic cyst s/p surgery 2022 presenting to [facility] with near syncope and general "not feeling well."     Per patient she has been feeling fatigued, limited appetite, with intermittent abdominal pain localized to epigastric region radiating across upper quadrants. Pain is constant, 4/10, dull, without known exacerbating or alleviating factors, ongoing for the last few days. Also reports that she has had nausea and episode non-bloody vomiting with abdominal pain. Was out to lunch with spouse and suddenly became sweaty while sitting at table and felt as though she was going go pass out.EMS called, was found to be hypotensive  . She does endorse abdominal distension over past several months. Denies weight loss. Denies any overt signs of GIB including hematemesis, hematochezia, melena, or CGE. Had resection of portion of pancreatic tail for cyst and splenectomy by Dr. Phani Mtz at [place] in 2022. Per patient subsequent post procedure MRI's have all be unremarkable with most recent this past July and has scheduled followup with Dr. Mtz this  along with 2 other appointments with physicians in [facility]. Patient currently being treated for pneumonia. CT findings here consistent with cystic fluid collection with calcifications in pancreas.    PAST MEDICAL & SURGICAL HISTORY:  SCC (squamous cell carcinoma)  nose      Hypertension      PAF (paroxysmal atrial fibrillation)  with rapid vr      Malignant melanoma      Cataracts, bilateral      High cholesterol      Umbilical hernia      Ectopic pregnancy      Surgery, elective  tumor removal, nose, 3/14/2017      Depression      History of salpingo-oophorectomy      H/O:  section      History of Mohs surgery for squamous cell carcinoma in situ of skin  nose      Elective surgery  heart recorder  removed 2018      Surgery, elective  right eye corner reconstruction x 7      Cataracts, bilateral    pancreatic tail surgery for cyst        MEDICATIONS  (STANDING):  aMIOdarone    Tablet 100 milliGRAM(s) Oral daily  aspirin enteric coated 81 milliGRAM(s) Oral daily  busPIRone 10 milliGRAM(s) Oral two times a day  cefTRIAXone Injectable. 1000 milliGRAM(s) IV Push every 24 hours  dextrose 5%. 1000 milliLiter(s) (100 mL/Hr) IV Continuous <Continuous>  dextrose 5%. 1000 milliLiter(s) (50 mL/Hr) IV Continuous <Continuous>  dextrose 50% Injectable 25 Gram(s) IV Push once  dextrose 50% Injectable 12.5 Gram(s) IV Push once  dextrose 50% Injectable 25 Gram(s) IV Push once  diltiazem    milliGRAM(s) Oral daily  enoxaparin Injectable 40 milliGRAM(s) SubCutaneous every 24 hours  glucagon  Injectable 1 milliGRAM(s) IntraMuscular once  influenza  Vaccine (HIGH DOSE) 0.7 milliLiter(s) IntraMuscular once  insulin lispro (ADMELOG) corrective regimen sliding scale   SubCutaneous three times a day before meals  insulin lispro (ADMELOG) corrective regimen sliding scale   SubCutaneous at bedtime  pantoprazole    Tablet 40 milliGRAM(s) Oral before breakfast    MEDICATIONS  (PRN):  dextrose Oral Gel 15 Gram(s) Oral once PRN Blood Glucose LESS THAN 70 milliGRAM(s)/deciliter  morphine  - Injectable 1 milliGRAM(s) IV Push every 6 hours PRN Severe Pain (7 - 10)  ondansetron Injectable 4 milliGRAM(s) IV Push every 6 hours PRN Nausea and/or Vomiting      Allergies    No Known Allergies    Intolerances    SOCIAL HISTORY:  no smoking, drinking or drugs    FAMILY HISTORY:  no colon or stomach cancer    REVIEW OF SYSTEMS:    CONSTITUTIONAL: No weakness, fevers or chills  EYES/ENT: No visual changes;  No vertigo or throat pain   NECK: No pain or stiffness  RESPIRATORY: No cough, wheezing, hemoptysis; No shortness of breath  CARDIOVASCULAR: No chest pain or palpitations  GASTROINTESTINAL: See HPI  GENITOURINARY: No dysuria, frequency or hematuria  NEUROLOGICAL: No numbness or weakness  SKIN: No itching, burning, rashes, or lesions   PSYCH: Normal mood and affect  All other review of systems is negative unless indicated above.    Vital Signs Last 24 Hrs  T(C): 36.8 (18 Sep 2023 15:16), Max: 38.8 (17 Sep 2023 17:15)  T(F): 98.2 (18 Sep 2023 15:16), Max: 101.9 (17 Sep 2023 17:15)  HR: 77 (18 Sep 2023 15:16) (66 - 96)  BP: 113/67 (18 Sep 2023 15:16) (92/54 - 143/62)  BP(mean): 77 (18 Sep 2023 01:06) (64 - 90)  RR: 18 (18 Sep 2023 15:16) (16 - 21)  SpO2: 96% (18 Sep 2023 15:16) (93% - 100%)    Parameters below as of 18 Sep 2023 15:16  Patient On (Oxygen Delivery Method): room air        PHYSICAL EXAM:    Constitutional: No acute distress, well-developed, non-toxic appearing  HEENT: unmasked, good phonation, not icteric  Neck: supple, no lymphadenopathy  Respiratory: decreased to ascultation bilaterally, no wheezing  Cardiovascular: S1 and S2, regular rate and rhythm, no murmurs rubs or gallops  Gastrointestinal: soft, non-tender, protuberant due to habitus, +bowel sounds, no rebound or guarding, + surgical scars, no drains  Extremities: No peripheral edema, no cyanosis or clubbing  Vascular: 2+ peripheral pulses, no venous stasis  Neurological: A/O x 3, no focal deficits, no asterixis  Psychiatric: Normal mood, normal affect  Skin: No rashes, not jaundiced    LABS:                        11.0   8.93  )-----------( 312      ( 18 Sep 2023 06:51 )             31.6     09-18    131<L>  |  100  |  24<H>  ----------------------------<  143<H>  3.1<L>   |  22  |  1.43<H>    Ca    8.6      18 Sep 2023 06:51  Mg     1.9     09-17    TPro  5.9<L>  /  Alb  2.1<L>  /  TBili  0.5  /  DBili  x   /  AST  30  /  ALT  12  /  AlkPhos  99  09-18    PT/INR - ( 18 Sep 2023 06:51 )   PT: 11.8 sec;   INR: 1.05 ratio         PTT - ( 18 Sep 2023 06:51 )  PTT:25.2 sec  LIVER FUNCTIONS - ( 18 Sep 2023 06:51 )  Alb: 2.1 g/dL / Pro: 5.9 gm/dL / ALK PHOS: 99 U/L / ALT: 12 U/L / AST: 30 U/L / GGT: x             RADIOLOGY & ADDITIONAL STUDIES:  IMPRESSION:  Large air-containing heterogeneous cystic collection with peripheral   calcifications occupying the distal body and tail of the pancreas and   inseparable from the stomach presumably sequela of necrotizing   pancreatitis and pseudocyst formation. Air may be from superimposed   infection with +/- presence of gastric fistula. Spleen not visualized;   please correlate clinically for prior splenectomy    Nonspecific moderate perinephric stranding without hydronephrosis.   Subcentimeter indeterminate hyperdense right renal lesion presumably a   hemorrhagic or proteinaceous cyst. This can be further characterized with   renal sonogram as clinically warranted.

## 2023-09-18 NOTE — CONSULT NOTE ADULT - ATTENDING COMMENTS
Patient seen and evaluated. She likely has a postoperative collection. We will try to obtain the last set of postop images for comparison. Patient seen and evaluated. She likely has a postoperative collection. We will try to obtain the last set of postop images for comparison but she will need drainage. We would recommend internal drainage. Would consult GI for transgastric drainage.

## 2023-09-19 ENCOUNTER — TRANSCRIPTION ENCOUNTER (OUTPATIENT)
Age: 78
End: 2023-09-19

## 2023-09-19 VITALS
DIASTOLIC BLOOD PRESSURE: 58 MMHG | RESPIRATION RATE: 18 BRPM | SYSTOLIC BLOOD PRESSURE: 125 MMHG | OXYGEN SATURATION: 96 % | HEART RATE: 81 BPM | TEMPERATURE: 99 F

## 2023-09-19 LAB
ANION GAP SERPL CALC-SCNC: 7 MMOL/L — SIGNIFICANT CHANGE UP (ref 5–17)
BUN SERPL-MCNC: 19 MG/DL — SIGNIFICANT CHANGE UP (ref 7–23)
CALCIUM SERPL-MCNC: 8.7 MG/DL — SIGNIFICANT CHANGE UP (ref 8.5–10.1)
CHLORIDE SERPL-SCNC: 102 MMOL/L — SIGNIFICANT CHANGE UP (ref 96–108)
CO2 SERPL-SCNC: 24 MMOL/L — SIGNIFICANT CHANGE UP (ref 22–31)
CREAT SERPL-MCNC: 1.31 MG/DL — HIGH (ref 0.5–1.3)
CULTURE RESULTS: NO GROWTH — SIGNIFICANT CHANGE UP
EGFR: 41 ML/MIN/1.73M2 — LOW
GLUCOSE BLDC GLUCOMTR-MCNC: 183 MG/DL — HIGH (ref 70–99)
GLUCOSE SERPL-MCNC: 168 MG/DL — HIGH (ref 70–99)
HCT VFR BLD CALC: 30.1 % — LOW (ref 34.5–45)
HGB BLD-MCNC: 10.2 G/DL — LOW (ref 11.5–15.5)
MCHC RBC-ENTMCNC: 30.6 PG — SIGNIFICANT CHANGE UP (ref 27–34)
MCHC RBC-ENTMCNC: 33.9 GM/DL — SIGNIFICANT CHANGE UP (ref 32–36)
MCV RBC AUTO: 90.4 FL — SIGNIFICANT CHANGE UP (ref 80–100)
PLATELET # BLD AUTO: 337 K/UL — SIGNIFICANT CHANGE UP (ref 150–400)
POTASSIUM SERPL-MCNC: 3.2 MMOL/L — LOW (ref 3.5–5.3)
POTASSIUM SERPL-SCNC: 3.2 MMOL/L — LOW (ref 3.5–5.3)
RBC # BLD: 3.33 M/UL — LOW (ref 3.8–5.2)
RBC # FLD: 13.9 % — SIGNIFICANT CHANGE UP (ref 10.3–14.5)
SODIUM SERPL-SCNC: 133 MMOL/L — LOW (ref 135–145)
SPECIMEN SOURCE: SIGNIFICANT CHANGE UP
WBC # BLD: 9.33 K/UL — SIGNIFICANT CHANGE UP (ref 3.8–10.5)
WBC # FLD AUTO: 9.33 K/UL — SIGNIFICANT CHANGE UP (ref 3.8–10.5)

## 2023-09-19 PROCEDURE — 99238 HOSP IP/OBS DSCHRG MGMT 30/<: CPT

## 2023-09-19 RX ORDER — ASPIRIN/CALCIUM CARB/MAGNESIUM 324 MG
1 TABLET ORAL
Qty: 0 | Refills: 0 | DISCHARGE
Start: 2023-09-19

## 2023-09-19 RX ORDER — DILTIAZEM HCL 120 MG
1 CAPSULE, EXT RELEASE 24 HR ORAL
Qty: 0 | Refills: 0 | DISCHARGE
Start: 2023-09-19

## 2023-09-19 RX ORDER — AMIODARONE HYDROCHLORIDE 400 MG/1
0.5 TABLET ORAL
Qty: 0 | Refills: 0 | DISCHARGE
Start: 2023-09-19

## 2023-09-19 RX ORDER — POTASSIUM CHLORIDE 20 MEQ
40 PACKET (EA) ORAL ONCE
Refills: 0 | Status: DISCONTINUED | OUTPATIENT
Start: 2023-09-19 | End: 2023-09-19

## 2023-09-19 RX ORDER — PANTOPRAZOLE SODIUM 20 MG/1
1 TABLET, DELAYED RELEASE ORAL
Qty: 0 | Refills: 0 | DISCHARGE
Start: 2023-09-19

## 2023-09-19 RX ADMIN — Medication 1: at 08:47

## 2023-09-19 RX ADMIN — AMIODARONE HYDROCHLORIDE 100 MILLIGRAM(S): 400 TABLET ORAL at 10:50

## 2023-09-19 RX ADMIN — CEFTRIAXONE 1000 MILLIGRAM(S): 500 INJECTION, POWDER, FOR SOLUTION INTRAMUSCULAR; INTRAVENOUS at 05:31

## 2023-09-19 RX ADMIN — ENOXAPARIN SODIUM 40 MILLIGRAM(S): 100 INJECTION SUBCUTANEOUS at 10:51

## 2023-09-19 RX ADMIN — Medication 300 MILLIGRAM(S): at 10:50

## 2023-09-19 RX ADMIN — PANTOPRAZOLE SODIUM 40 MILLIGRAM(S): 20 TABLET, DELAYED RELEASE ORAL at 05:31

## 2023-09-19 RX ADMIN — Medication 81 MILLIGRAM(S): at 10:50

## 2023-09-19 RX ADMIN — Medication 10 MILLIGRAM(S): at 10:51

## 2023-09-19 NOTE — DISCHARGE NOTE PROVIDER - NSDCMRMEDTOKEN_GEN_ALL_CORE_FT
amiodarone:   amiodarone 200 mg oral tablet: 0.5 tab(s) orally once a day  aspirin 81 mg oral delayed release tablet: 1 tab(s) orally once a day  busPIRone 10 mg oral tablet: 1 tab(s) orally 2 times a day  dilTIAZem 300 mg/24 hours oral capsule, extended release: 1 cap(s) orally once a day  pantoprazole 40 mg oral delayed release tablet: 1 tab(s) orally once a day (before a meal)

## 2023-09-19 NOTE — DISCHARGE NOTE PROVIDER - HOSPITAL COURSE
78 year old female with history panc tail cyst with resection and splenectomy 7/2022 with cystic collection with calcifications on CT    Imp: Pancreatic pseudocyst with calcifications    Rec:  ::Recommended cyst gastrostomy and discussed with patient--->declined any procedures until she sees her surgeon, Dr. Phani Mtz on Wednesday

## 2023-09-19 NOTE — DISCHARGE NOTE NURSING/CASE MANAGEMENT/SOCIAL WORK - PATIENT PORTAL LINK FT
You can access the FollowMyHealth Patient Portal offered by [facility] by registering at the following website: [email or web address]. By joining [facility]’s FollowMyHealth portal, you will also be able to view your health information using other applications (apps) compatible with our system.

## 2023-09-19 NOTE — DISCHARGE NOTE NURSING/CASE MANAGEMENT/SOCIAL WORK - NSDCPEFALRISK_GEN_ALL_CORE
For information on Fall & Injury Prevention, visit: [email or web address] OR  [email or web address] OR  https://www.cdc.gov/steadi/patient.html

## 2023-09-19 NOTE — DISCHARGE NOTE PROVIDER - NSDCCPCAREPLAN_GEN_ALL_CORE_FT
PRINCIPAL DISCHARGE DIAGNOSIS  Diagnosis: Abnormal CT scan  Assessment and Plan of Treatment: pls take CD to your surgeon tomorrow; we reccommended procedure but you declined and want to see your doc      SECONDARY DISCHARGE DIAGNOSES  Diagnosis: Acute pancreatitis  Assessment and Plan of Treatment:

## 2023-09-22 LAB
CULTURE RESULTS: SIGNIFICANT CHANGE UP
SPECIMEN SOURCE: SIGNIFICANT CHANGE UP

## 2023-09-23 LAB
CULTURE RESULTS: SIGNIFICANT CHANGE UP
SPECIMEN SOURCE: SIGNIFICANT CHANGE UP

## 2023-09-27 DIAGNOSIS — E11.9 TYPE 2 DIABETES MELLITUS WITHOUT COMPLICATIONS: ICD-10-CM

## 2023-09-27 DIAGNOSIS — K85.90 ACUTE PANCREATITIS WITHOUT NECROSIS OR INFECTION, UNSPECIFIED: ICD-10-CM

## 2023-09-27 DIAGNOSIS — E87.1 HYPO-OSMOLALITY AND HYPONATREMIA: ICD-10-CM

## 2023-09-27 DIAGNOSIS — F32.A DEPRESSION, UNSPECIFIED: ICD-10-CM

## 2023-09-27 DIAGNOSIS — K86.89 OTHER SPECIFIED DISEASES OF PANCREAS: ICD-10-CM

## 2023-09-27 DIAGNOSIS — I48.0 PAROXYSMAL ATRIAL FIBRILLATION: ICD-10-CM

## 2023-09-27 DIAGNOSIS — I10 ESSENTIAL (PRIMARY) HYPERTENSION: ICD-10-CM

## 2024-01-05 NOTE — ED PROVIDER NOTE - RESPIRATORY, MLM
education level: Not on file   Occupational History    Not on file   Tobacco Use    Smoking status: Former     Types: Cigarettes    Smokeless tobacco: Not on file   Substance and Sexual Activity    Alcohol use: Never    Drug use: Never    Sexual activity: Not on file   Other Topics Concern    Not on file   Social History Narrative    Not on file     Social Determinants of Health     Financial Resource Strain: Not on file   Food Insecurity: Not on file   Transportation Needs: Not on file   Physical Activity: Not on file   Stress: Not on file   Social Connections: Not on file   Intimate Partner Violence: Not on file   Housing Stability: Not on file     No family history on file.    Review of Systems  Constitutional:   Negative for fever.  Genitourinary:  Negative for urinary burning.  Musculoskeletal:  Negative for back pain.      There were no vitals taken for this visit.        Physical Exam    Assessment and Plan    Cheko was seen today for advice only and elevated psa.    Diagnoses and all orders for this visit:    Elevated PSA    Family hx of prostate cancer    Prostate cancer (HCC)  -     PSA, Diagnostic; Future     Stage T1c grade 3+3 prostate cancer.   Pathology results from the biopsy and all available treatment options were reviewed in detail with the patient today.  Treatments options discussed but not limited to included surgery (open, perineal and robotic assisted laparoscopic approaches), external beam radiation, brachytherapy, proton beam therapy, cryosurgery, HIFU, androgen deprivation therapy and watchful waiting including active surveillance.  I discussed all risks, possible side effects and benefits associated with the above treatment options.  I specifically discussed the 30-40% risk of permanent ED in men without preoperative ED, 100% risk of ED in men with preoperative ED, 10-15% risk of permanent incontinence requiring pads, and 2-3% risk of severe incontinence in patients undergoing open or  Decreased breathe sounds on left base.

## 2024-06-12 ENCOUNTER — EMERGENCY (EMERGENCY)
Facility: HOSPITAL | Age: 79
LOS: 0 days | Discharge: ANOTHER HOSPITAL | End: 2024-06-12
Attending: STUDENT IN AN ORGANIZED HEALTH CARE EDUCATION/TRAINING PROGRAM
Payer: COMMERCIAL

## 2024-06-12 ENCOUNTER — INPATIENT (INPATIENT)
Facility: HOSPITAL | Age: 79
LOS: 1 days | Discharge: HOME | End: 2024-06-14
Attending: INTERNAL MEDICINE | Admitting: INTERNAL MEDICINE
Payer: MEDICARE

## 2024-06-12 ENCOUNTER — TRANSCRIPTION ENCOUNTER (OUTPATIENT)
Age: 79
End: 2024-06-12

## 2024-06-12 VITALS
TEMPERATURE: 98 F | SYSTOLIC BLOOD PRESSURE: 156 MMHG | HEART RATE: 111 BPM | RESPIRATION RATE: 18 BRPM | OXYGEN SATURATION: 100 % | DIASTOLIC BLOOD PRESSURE: 88 MMHG

## 2024-06-12 VITALS
RESPIRATION RATE: 18 BRPM | OXYGEN SATURATION: 99 % | HEART RATE: 97 BPM | DIASTOLIC BLOOD PRESSURE: 86 MMHG | SYSTOLIC BLOOD PRESSURE: 147 MMHG

## 2024-06-12 VITALS
WEIGHT: 130.07 LBS | DIASTOLIC BLOOD PRESSURE: 84 MMHG | OXYGEN SATURATION: 100 % | HEIGHT: 62 IN | TEMPERATURE: 98 F | SYSTOLIC BLOOD PRESSURE: 157 MMHG | RESPIRATION RATE: 18 BRPM | HEART RATE: 94 BPM

## 2024-06-12 DIAGNOSIS — Z90.79 ACQUIRED ABSENCE OF OTHER GENITAL ORGAN(S): Chronic | ICD-10-CM

## 2024-06-12 DIAGNOSIS — H26.9 UNSPECIFIED CATARACT: Chronic | ICD-10-CM

## 2024-06-12 DIAGNOSIS — Z23 ENCOUNTER FOR IMMUNIZATION: ICD-10-CM

## 2024-06-12 DIAGNOSIS — I10 ESSENTIAL (PRIMARY) HYPERTENSION: ICD-10-CM

## 2024-06-12 DIAGNOSIS — S05.32XA OCULAR LACERATION WITHOUT PROLAPSE OR LOSS OF INTRAOCULAR TISSUE, LEFT EYE, INITIAL ENCOUNTER: ICD-10-CM

## 2024-06-12 DIAGNOSIS — C80.1 MALIGNANT (PRIMARY) NEOPLASM, UNSPECIFIED: ICD-10-CM

## 2024-06-12 DIAGNOSIS — S05.92XA UNSPECIFIED INJURY OF LEFT EYE AND ORBIT, INITIAL ENCOUNTER: ICD-10-CM

## 2024-06-12 DIAGNOSIS — Z41.9 ENCOUNTER FOR PROCEDURE FOR PURPOSES OTHER THAN REMEDYING HEALTH STATE, UNSPECIFIED: Chronic | ICD-10-CM

## 2024-06-12 DIAGNOSIS — Z98.890 OTHER SPECIFIED POSTPROCEDURAL STATES: Chronic | ICD-10-CM

## 2024-06-12 DIAGNOSIS — F32.A DEPRESSION, UNSPECIFIED: ICD-10-CM

## 2024-06-12 DIAGNOSIS — E78.5 HYPERLIPIDEMIA, UNSPECIFIED: ICD-10-CM
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
## 2024-06-12 DIAGNOSIS — Z98.891 HISTORY OF UTERINE SCAR FROM PREVIOUS SURGERY: Chronic | ICD-10-CM
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
## 2024-06-12 DIAGNOSIS — I48.0 PAROXYSMAL ATRIAL FIBRILLATION: ICD-10-CM

## 2024-06-12 LAB
ALBUMIN SERPL ELPH-MCNC: 3 G/DL — LOW (ref 3.3–5)
ALP SERPL-CCNC: 91 U/L — SIGNIFICANT CHANGE UP (ref 40–120)
ALT FLD-CCNC: 10 U/L — LOW (ref 12–78)
ANION GAP SERPL CALC-SCNC: 5 MMOL/L — SIGNIFICANT CHANGE UP (ref 5–17)
APTT BLD: 28.4 SEC — SIGNIFICANT CHANGE UP (ref 24.5–35.6)
AST SERPL-CCNC: 42 U/L — HIGH (ref 15–37)
BASOPHILS # BLD AUTO: 0.11 K/UL — SIGNIFICANT CHANGE UP (ref 0–0.2)
BASOPHILS NFR BLD AUTO: 1.2 % — SIGNIFICANT CHANGE UP (ref 0–2)
BILIRUB SERPL-MCNC: 0.3 MG/DL — SIGNIFICANT CHANGE UP (ref 0.2–1.2)
BLD GP AB SCN SERPL QL: NEGATIVE — SIGNIFICANT CHANGE UP
BUN SERPL-MCNC: 21 MG/DL — SIGNIFICANT CHANGE UP (ref 7–23)
CALCIUM SERPL-MCNC: 9.6 MG/DL — SIGNIFICANT CHANGE UP (ref 8.5–10.1)
CHLORIDE SERPL-SCNC: 106 MMOL/L — SIGNIFICANT CHANGE UP (ref 96–108)
CO2 SERPL-SCNC: 28 MMOL/L — SIGNIFICANT CHANGE UP (ref 22–31)
CREAT SERPL-MCNC: 1.42 MG/DL — HIGH (ref 0.5–1.3)
EGFR: 37 ML/MIN/1.73M2 — LOW
EOSINOPHIL # BLD AUTO: 0.11 K/UL — SIGNIFICANT CHANGE UP (ref 0–0.5)
EOSINOPHIL NFR BLD AUTO: 1.2 % — SIGNIFICANT CHANGE UP (ref 0–6)
GLUCOSE BLDC GLUCOMTR-MCNC: 128 MG/DL — HIGH (ref 70–99)
GLUCOSE BLDC GLUCOMTR-MCNC: 68 MG/DL — LOW (ref 70–99)
GLUCOSE BLDC GLUCOMTR-MCNC: 72 MG/DL — SIGNIFICANT CHANGE UP (ref 70–99)
GLUCOSE BLDC GLUCOMTR-MCNC: 97 MG/DL — SIGNIFICANT CHANGE UP (ref 70–99)
GLUCOSE SERPL-MCNC: 107 MG/DL — HIGH (ref 70–99)
HCT VFR BLD CALC: 35.6 % — SIGNIFICANT CHANGE UP (ref 34.5–45)
HGB BLD-MCNC: 11.6 G/DL — SIGNIFICANT CHANGE UP (ref 11.5–15.5)
IMM GRANULOCYTES NFR BLD AUTO: 0.5 % — SIGNIFICANT CHANGE UP (ref 0–0.9)
INR BLD: 0.93 RATIO — SIGNIFICANT CHANGE UP (ref 0.85–1.18)
LACTATE SERPL-SCNC: 3.5 MMOL/L — HIGH (ref 0.7–2)
LIDOCAIN IGE QN: 55 U/L — SIGNIFICANT CHANGE UP (ref 13–75)
LYMPHOCYTES # BLD AUTO: 3.5 K/UL — HIGH (ref 1–3.3)
LYMPHOCYTES # BLD AUTO: 37.9 % — SIGNIFICANT CHANGE UP (ref 13–44)
MCHC RBC-ENTMCNC: 30 PG — SIGNIFICANT CHANGE UP (ref 27–34)
MCHC RBC-ENTMCNC: 32.6 GM/DL — SIGNIFICANT CHANGE UP (ref 32–36)
MCV RBC AUTO: 92 FL — SIGNIFICANT CHANGE UP (ref 80–100)
MONOCYTES # BLD AUTO: 0.93 K/UL — HIGH (ref 0–0.9)
MONOCYTES NFR BLD AUTO: 10.1 % — SIGNIFICANT CHANGE UP (ref 2–14)
NEUTROPHILS # BLD AUTO: 4.54 K/UL — SIGNIFICANT CHANGE UP (ref 1.8–7.4)
NEUTROPHILS NFR BLD AUTO: 49.1 % — SIGNIFICANT CHANGE UP (ref 43–77)
PLATELET # BLD AUTO: 339 K/UL — SIGNIFICANT CHANGE UP (ref 150–400)
POTASSIUM SERPL-MCNC: 4.6 MMOL/L — SIGNIFICANT CHANGE UP (ref 3.5–5.3)
POTASSIUM SERPL-SCNC: 4.6 MMOL/L — SIGNIFICANT CHANGE UP (ref 3.5–5.3)
PROT SERPL-MCNC: 7.2 GM/DL — SIGNIFICANT CHANGE UP (ref 6–8.3)
PROTHROM AB SERPL-ACNC: 10.5 SEC — SIGNIFICANT CHANGE UP (ref 9.5–13)
RBC # BLD: 3.87 M/UL — SIGNIFICANT CHANGE UP (ref 3.8–5.2)
RBC # FLD: 15 % — HIGH (ref 10.3–14.5)
RH IG SCN BLD-IMP: NEGATIVE — SIGNIFICANT CHANGE UP
SODIUM SERPL-SCNC: 139 MMOL/L — SIGNIFICANT CHANGE UP (ref 135–145)
WBC # BLD: 9.24 K/UL — SIGNIFICANT CHANGE UP (ref 3.8–10.5)
WBC # FLD AUTO: 9.24 K/UL — SIGNIFICANT CHANGE UP (ref 3.8–10.5)

## 2024-06-12 PROCEDURE — 71260 CT THORAX DX C+: CPT | Mod: MC

## 2024-06-12 PROCEDURE — 76376 3D RENDER W/INTRP POSTPROCES: CPT | Mod: 26

## 2024-06-12 PROCEDURE — 70480 CT ORBIT/EAR/FOSSA W/O DYE: CPT | Mod: 26,MC,XU

## 2024-06-12 PROCEDURE — 85025 COMPLETE CBC W/AUTO DIFF WBC: CPT

## 2024-06-12 PROCEDURE — 85610 PROTHROMBIN TIME: CPT

## 2024-06-12 PROCEDURE — 90715 TDAP VACCINE 7 YRS/> IM: CPT

## 2024-06-12 PROCEDURE — 99285 EMERGENCY DEPT VISIT HI MDM: CPT | Mod: 25

## 2024-06-12 PROCEDURE — 71260 CT THORAX DX C+: CPT | Mod: 26,MC

## 2024-06-12 PROCEDURE — 83690 ASSAY OF LIPASE: CPT

## 2024-06-12 PROCEDURE — 70450 CT HEAD/BRAIN W/O DYE: CPT | Mod: MC

## 2024-06-12 PROCEDURE — 36415 COLL VENOUS BLD VENIPUNCTURE: CPT

## 2024-06-12 PROCEDURE — 93010 ELECTROCARDIOGRAM REPORT: CPT

## 2024-06-12 PROCEDURE — 80053 COMPREHEN METABOLIC PANEL: CPT

## 2024-06-12 PROCEDURE — 99285 EMERGENCY DEPT VISIT HI MDM: CPT

## 2024-06-12 PROCEDURE — 85730 THROMBOPLASTIN TIME PARTIAL: CPT

## 2024-06-12 PROCEDURE — 93005 ELECTROCARDIOGRAM TRACING: CPT

## 2024-06-12 PROCEDURE — 76376 3D RENDER W/INTRP POSTPROCES: CPT

## 2024-06-12 PROCEDURE — 72125 CT NECK SPINE W/O DYE: CPT | Mod: MC

## 2024-06-12 PROCEDURE — 74177 CT ABD & PELVIS W/CONTRAST: CPT | Mod: 26,MC

## 2024-06-12 PROCEDURE — 90471 IMMUNIZATION ADMIN: CPT

## 2024-06-12 PROCEDURE — 83605 ASSAY OF LACTIC ACID: CPT

## 2024-06-12 PROCEDURE — 74177 CT ABD & PELVIS W/CONTRAST: CPT | Mod: MC

## 2024-06-12 PROCEDURE — 72125 CT NECK SPINE W/O DYE: CPT | Mod: 26,MC

## 2024-06-12 PROCEDURE — 70480 CT ORBIT/EAR/FOSSA W/O DYE: CPT | Mod: MC

## 2024-06-12 PROCEDURE — 70450 CT HEAD/BRAIN W/O DYE: CPT | Mod: 26,MC

## 2024-06-12 RX ORDER — CEFEPIME 1 G/1
1000 INJECTION, POWDER, FOR SOLUTION INTRAMUSCULAR; INTRAVENOUS ONCE
Refills: 0 | Status: DISCONTINUED | OUTPATIENT
Start: 2024-06-12 | End: 2024-06-12

## 2024-06-12 RX ORDER — DEXTROSE 50 % IN WATER 50 %
12.5 SYRINGE (ML) INTRAVENOUS ONCE
Refills: 0 | Status: DISCONTINUED | OUTPATIENT
Start: 2024-06-12 | End: 2024-06-14

## 2024-06-12 RX ORDER — PREGABALIN 225 MG/1
1 CAPSULE ORAL
Refills: 0 | DISCHARGE

## 2024-06-12 RX ORDER — SODIUM CHLORIDE 9 MG/ML
1000 INJECTION, SOLUTION INTRAVENOUS
Refills: 0 | Status: DISCONTINUED | OUTPATIENT
Start: 2024-06-12 | End: 2024-06-14

## 2024-06-12 RX ORDER — DEXTROSE 50 % IN WATER 50 %
12.5 SYRINGE (ML) INTRAVENOUS ONCE
Refills: 0 | Status: COMPLETED | OUTPATIENT
Start: 2024-06-12 | End: 2024-06-12

## 2024-06-12 RX ORDER — METFORMIN HYDROCHLORIDE 850 MG/1
1 TABLET ORAL
Refills: 0 | DISCHARGE

## 2024-06-12 RX ORDER — FLUORESCEIN SODIUM 9 MG
1 STRIP OPHTHALMIC (EYE) ONCE
Refills: 0 | Status: COMPLETED | OUTPATIENT
Start: 2024-06-12 | End: 2024-06-12

## 2024-06-12 RX ORDER — POTASSIUM CHLORIDE 20 MEQ
1 PACKET (EA) ORAL
Refills: 0 | DISCHARGE

## 2024-06-12 RX ORDER — VANCOMYCIN HCL 1 G
1000 VIAL (EA) INTRAVENOUS ONCE
Refills: 0 | Status: COMPLETED | OUTPATIENT
Start: 2024-06-12 | End: 2024-06-12

## 2024-06-12 RX ORDER — INSULIN DETEMIR 100/ML (3)
8 INSULIN PEN (ML) SUBCUTANEOUS DAILY
Refills: 0 | Status: DISCONTINUED | OUTPATIENT
Start: 2024-06-13 | End: 2024-06-13

## 2024-06-12 RX ORDER — TETANUS TOXOID, REDUCED DIPHTHERIA TOXOID AND ACELLULAR PERTUSSIS VACCINE, ADSORBED 5; 2.5; 8; 8; 2.5 [IU]/.5ML; [IU]/.5ML; UG/.5ML; UG/.5ML; UG/.5ML
0.5 SUSPENSION INTRAMUSCULAR ONCE
Refills: 0 | Status: COMPLETED | OUTPATIENT
Start: 2024-06-12 | End: 2024-06-12

## 2024-06-12 RX ORDER — SODIUM CHLORIDE 9 MG/ML
500 INJECTION INTRAMUSCULAR; INTRAVENOUS; SUBCUTANEOUS ONCE
Refills: 0 | Status: COMPLETED | OUTPATIENT
Start: 2024-06-12 | End: 2024-06-12

## 2024-06-12 RX ORDER — DUPILUMAB 300 MG/2ML
300 INJECTION, SOLUTION SUBCUTANEOUS
Refills: 0 | DISCHARGE

## 2024-06-12 RX ORDER — ESOMEPRAZOLE MAGNESIUM 40 MG/1
1 CAPSULE, DELAYED RELEASE ORAL
Refills: 0 | DISCHARGE

## 2024-06-12 RX ORDER — INSULIN LISPRO 100/ML
VIAL (ML) SUBCUTANEOUS EVERY 6 HOURS
Refills: 0 | Status: DISCONTINUED | OUTPATIENT
Start: 2024-06-12 | End: 2024-06-13

## 2024-06-12 RX ORDER — CEFEPIME 1 G/1
1000 INJECTION, POWDER, FOR SOLUTION INTRAMUSCULAR; INTRAVENOUS ONCE
Refills: 0 | Status: COMPLETED | OUTPATIENT
Start: 2024-06-12 | End: 2024-06-12

## 2024-06-12 RX ORDER — DILTIAZEM HCL 120 MG
1 CAPSULE, EXT RELEASE 24 HR ORAL
Refills: 0 | DISCHARGE

## 2024-06-12 RX ORDER — GLUCAGON INJECTION, SOLUTION 0.5 MG/.1ML
1 INJECTION, SOLUTION SUBCUTANEOUS ONCE
Refills: 0 | Status: DISCONTINUED | OUTPATIENT
Start: 2024-06-12 | End: 2024-06-14

## 2024-06-12 RX ORDER — VANCOMYCIN HCL 1 G
1000 VIAL (EA) INTRAVENOUS ONCE
Refills: 0 | Status: DISCONTINUED | OUTPATIENT
Start: 2024-06-12 | End: 2024-06-12

## 2024-06-12 RX ORDER — FENTANYL CITRATE 50 UG/ML
50 INJECTION INTRAVENOUS ONCE
Refills: 0 | Status: DISCONTINUED | OUTPATIENT
Start: 2024-06-12 | End: 2024-06-12

## 2024-06-12 RX ORDER — ARIPIPRAZOLE 15 MG/1
1 TABLET ORAL
Refills: 0 | DISCHARGE

## 2024-06-12 RX ORDER — DEXTROSE 50 % IN WATER 50 %
25 SYRINGE (ML) INTRAVENOUS ONCE
Refills: 0 | Status: DISCONTINUED | OUTPATIENT
Start: 2024-06-12 | End: 2024-06-14

## 2024-06-12 RX ORDER — DEXTROSE 50 % IN WATER 50 %
15 SYRINGE (ML) INTRAVENOUS ONCE
Refills: 0 | Status: DISCONTINUED | OUTPATIENT
Start: 2024-06-12 | End: 2024-06-14

## 2024-06-12 RX ORDER — CHOLECALCIFEROL (VITAMIN D3) 125 MCG
1 CAPSULE ORAL
Refills: 0 | DISCHARGE

## 2024-06-12 RX ORDER — ROSUVASTATIN CALCIUM 5 MG/1
1 TABLET ORAL
Refills: 0 | DISCHARGE

## 2024-06-12 RX ORDER — AMIODARONE HYDROCHLORIDE 400 MG/1
0.5 TABLET ORAL
Refills: 0 | DISCHARGE

## 2024-06-12 RX ORDER — INSULIN DETEMIR 100/ML (3)
16 INSULIN PEN (ML) SUBCUTANEOUS
Refills: 0 | DISCHARGE

## 2024-06-12 RX ORDER — DEXTROSE 10 % IN WATER 10 %
125 INTRAVENOUS SOLUTION INTRAVENOUS ONCE
Refills: 0 | Status: DISCONTINUED | OUTPATIENT
Start: 2024-06-12 | End: 2024-06-14

## 2024-06-12 RX ADMIN — Medication 1 DROP(S): at 13:01

## 2024-06-12 RX ADMIN — Medication 1 APPLICATION(S): at 12:59

## 2024-06-12 RX ADMIN — TETANUS TOXOID, REDUCED DIPHTHERIA TOXOID AND ACELLULAR PERTUSSIS VACCINE, ADSORBED 0.5 MILLILITER(S): 5; 2.5; 8; 8; 2.5 SUSPENSION INTRAMUSCULAR at 13:30

## 2024-06-12 RX ADMIN — Medication 12.5 GRAM(S): at 23:03

## 2024-06-12 RX ADMIN — SODIUM CHLORIDE 500 MILLILITER(S): 9 INJECTION INTRAMUSCULAR; INTRAVENOUS; SUBCUTANEOUS at 13:52

## 2024-06-12 RX ADMIN — CEFEPIME 100 MILLIGRAM(S): 1 INJECTION, POWDER, FOR SOLUTION INTRAMUSCULAR; INTRAVENOUS at 17:42

## 2024-06-12 RX ADMIN — Medication 250 MILLIGRAM(S): at 18:45

## 2024-06-12 RX ADMIN — Medication 50 MILLIGRAM(S): at 17:45

## 2024-06-12 NOTE — ED ADULT NURSE REASSESSMENT NOTE - NS ED NURSE REASSESS COMMENT FT1
report received from day shift ED RN. pt resting in stretcher in NAD, RR even and unlabored. easily arousable to verbal stimuli, A&OX4, able to speak in clear complete sentences. repeat fingerstick WNL. eye shield in place over left eye and secured in place. safety measures maintained, side rails up x2. awaiting admitting bed placement

## 2024-06-12 NOTE — H&P ADULT - NSHPPHYSICALEXAM_GEN_ALL_CORE
Vital Signs Last 24 Hrs  T(C): 36.6 (12 Jun 2024 23:40), Max: 36.9 (12 Jun 2024 19:59)  T(F): 97.8 (12 Jun 2024 23:40), Max: 98.4 (12 Jun 2024 19:59)  HR: 93 (12 Jun 2024 23:40) (91 - 111)  BP: 144/71 (12 Jun 2024 23:40) (144/71 - 156/88)  BP(mean): 99 (12 Jun 2024 19:59) (99 - 99)  RR: 18 (12 Jun 2024 23:40) (18 - 20)  SpO2: 96% (12 Jun 2024 23:40) (96% - 100%)    Parameters below as of 12 Jun 2024 23:40  Patient On (Oxygen Delivery Method): room air        PHYSICAL EXAM:  GENERAL: No Acute Distress  EYES: L eye shield in place   ENMT: Moist mucous membranes   NECK: Supple  PULMONARY: Clear to auscultation bilaterally  CARDIAC: Regular rate and rhythm; No murmurs, rubs, or gallops  GASTROINTESTINAL: Abdomen soft, Nontender, Nondistended; Bowel sounds normal  EXTREMITIES:   No clubbing, cyanosis, or pedal edema  PSYCH: Normal Affect, Normal Behavior  NEUROLOGY:   - Mental status A&O x 3  - no tremor  SKIN: No rashes or lesions  MUSCULOSKELETAL: No joint swelling

## 2024-06-12 NOTE — ED ADULT TRIAGE NOTE - TEMPERATURE IN FAHRENHEIT (DEGREES F)
Problem: Non-Pressure Injury Wound  Goal: # No deterioration in wound  Outcome: Outcome Met, Continue evaluating goal progress toward completion  Patient seen for dressing change prior to hyperbaric therapy. The patient's drainage has decreased a little. The wound continues to have more granular tissue. The patient is going to surgery tomorrow for the wound. The patient will return on Friday for dressing change and hyperbaric treatment.      
98.1

## 2024-06-12 NOTE — ED ADULT NURSE REASSESSMENT NOTE - NS ED NURSE REASSESS COMMENT FT1
report given to [facility] RN, all questions answered. pt noted to be hypoglycemic, ACP called and made aware. pt medicated as per orders. repeat fingerstick as documented. [facility], RR even and unlabored. side rails up x2. all belongings with pt

## 2024-06-12 NOTE — ED PROVIDER NOTE - PHYSICAL EXAMINATION
GENERAL: A&Ox4, non-toxic appearing, no acute distress  HEENT: EOMI, oral mucosa moist, normal conjunctiva, no raccoon eyes, no hirsch's sign  EYE: L eye -Siedel's sign, no fluorescin uptake, sliver of tissue extending from the inferolateral corneal line, irregular L pupil (possible globe rupture vs traumatic hyphema)  RESP: CTAB, no respiratory distress, no wheezes/rhonchi/rales, speaking in full sentences  CV: RRR, no murmurs/rubs/gallops  ABDOMEN: soft, non-tender, non-distended, no guarding, no rebound tenderness  MSK: no visible deformities, no midline spinal tenderness, no step offs, FROM all extremities  NEURO: no focal sensory or motor deficits, CN 2-12 grossly intact, 5/5 strength in all extremities, PERRLA  SKIN: warm, normal color, well perfused, no rash, no external bleeding, R elbow skin tear, no ecchymosis  PSYCH: normal affect

## 2024-06-12 NOTE — ED PROVIDER NOTE - PROGRESS NOTE DETAILS
GUY Herrera: Spoke with optho, recommending vanc/cefepime, NPO for likely OR, pt already received Tetanus shot at [facility]. GUY Herrera: Pt evaluated by optho, concern for globe rupture, pt to be admitted with plan for likely OR GUY Herrera: Spoke with hospitalist, pt accepted to medicine, pt aware of admission
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
## 2024-06-12 NOTE — CONSULT NOTE ADULT - CONSULT REQUESTED DATE/TIME
Patient called stating she has not heard anything from [facility] [place] [facility] referral with regards to setting up an appointment and was told if she did not hear anything to call Dr Rayo's office.    Please call back to advise.  Thank you.   12-Jun-2024 17:33
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
## 2024-06-12 NOTE — ED PROVIDER NOTE - PHYSICAL EXAMINATION
left eye with irregular pupil, drainage from lateral aspect of eye left eye with irregular pupil, drainage from lateral aspect of eye, mild swelling to left upper eyelid

## 2024-06-12 NOTE — H&P ADULT - NSHPLABSRESULTS_GEN_ALL_CORE
11.6   9.24  )-----------( 339      ( 12 Jun 2024 12:47 )             35.6     139  |  106  |  21  ----------------------------<  107<H>     06-12  4.6   |  28  |  1.42<H>    Ca    9.6      12 Jun 2024 12:47    TPro  7.2  /  Alb  3.0<L>  /  TBili  0.3  /  DBili  x   /  AST  42<H>  /  ALT  10<L>  /  AlkPhos  91  06-12    PT/INR: 10.5/0.93 (06-12-24 @ 12:47)  PTT: 28.4 (06-12-24 @ 12:47)    CT brain, orbits, C spine:  IMPRESSION:    CT BRAIN: No acute intracranial bleeding.    Chronic right greater than left mastoid opacification may reflect   effusions.    CT CERVICAL SPINE: No fracture. Severe bilateral C5-C6 and C6-C7   foraminal stenosis due to advanced disc and uncovertebral joint/facet   degeneration.    CT FACE: Left periorbital soft tissue hematoma and laceration. Intact   globes.    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 Tiny punctate 2 mm   hyperdense focus in the right nasal soft tissues may be postsurgical or   reflect foreign body.    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

## 2024-06-12 NOTE — H&P ADULT - NSICDXFAMILYHX_GEN_ALL_CORE_FT
FAMILY HISTORY:  Father  Still living? Unknown  Family history of pacemaker, Age at diagnosis: Age Unknown
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
## 2024-06-12 NOTE — H&P ADULT - NSHPREVIEWOFSYSTEMS_GEN_ALL_CORE
Review of Systems:   CONSTITUTIONAL: No fever or chills  EYES: No eye pain, + visual disturbance  ENMT:  No difficulty hearing, no throat pain  NECK: No pain or stiffness  RESPIRATORY: No cough, No shortness of breath  CARDIOVASCULAR: No chest pain, palpitations, dizziness, or leg swelling  GASTROINTESTINAL: No abdominal pain, nausea, vomiting or diarrhea  GENITOURINARY: No dysuria, or hematuria  NEUROLOGICAL: No headaches, weakness, or numbness  SKIN: No rashes, or lesions   LYMPH NODES: No enlarged glands  ENDOCRINE: No heat or cold intolerance  MUSCULOSKELETAL: No joint pain or swelling  PSYCHIATRIC: No depression or anxiety  HEME/LYMPH: No easy bruising, or bleeding  ALLERGY AND IMMUNOLOGIC: No hives or eczema

## 2024-06-12 NOTE — H&P ADULT - PROBLEM SELECTOR PLAN 1
- reviewed ophthalmology consult  - continue cefepime  - check vancomycin level in the am (received 1 gram here and 1 at [place]) and dose by level  - plan is for OR

## 2024-06-12 NOTE — ED PROVIDER NOTE - PROGRESS NOTE DETAILS
Spoke with transfer center as there is clinical suspicion patient has globe rupture versus traumatic hyphema.  Follow-up with pupil sent to transfer team, they accept patient transfer to [facility].  CT of the head and neck without intracranial bleed, acute fracture, or obvious globe rupture.  Patient states her pain is controlled, left eye shield placed.  CT chest/abdomen/pelvis still not resulted however there is low clinical suspicion for acute intrathoracic or intra-abdominal findings.

## 2024-06-12 NOTE — ED PROVIDER NOTE - NSICDXPASTSURGICALHX_GEN_ALL_CORE_FT
[address] [address] EMERGENCY DEPT  EMERGENCY MEDICINE     Pt Name: Dylan Rachel  MRN: [id number]  [place] 1997  Date of evaluation: 1/22/2022  PCP:    LEVI Sim CNP  Provider: LEVI Whitaker CNP    CHIEF COMPLAINT       Chief Complaint   Patient presents with   Audra Almendarez is a 57-year-old female patient that presents to ER with complaint of bilateral ear pain. She states that her right side is worse than the left. She states she was seen in urgent care yesterday and they \"took care of everything except for her ears\". She states that they started her on zyrtec. Upon further examination patient was started on Omnicef as well. Patient presents today stating that her ears are not better after taking 2 doses of the antibiotic. Upon examination patient does have a bilateral otitis media. She is complaining of sinus pressure and pain. She denies pain on palpation of bilateral mastoids. She denies chest pain, shortness of breath, nausea, vomiting, diarrhea or fever. Denies taking anything for the ear pain. Triage notes and Nursing notes were reviewed by myself. Any discrepancies are addressed above.     PAST MEDICAL HISTORY     Past Medical History:   Diagnosis Date    ADD (attention deficit disorder)     Anxiety 04/08/2015    Anxiety attack     Asthma     exercise induced    Atypical face pain     Back pain     Bipolar 1 disorder ([address]     Diabetes mellitus ([address]     GDM on insulin started on 7/23-during pregnancy    Fibromyalgia     GERD (gastroesophageal reflux disease)     Hypotension 11/02/2017    Major depressive disorder, recurrent episode, mild ([address] 07/31/2012    Obesity 10/24/2014    WILFREDO treated with BiPAP     Pneumonia 02/21/2018    POTS (postural orthostatic tachycardia syndrome)     Pott disease     Pulmonary emboli ([address] 2016    was on blood thinners for 6 months    Seizures ([address] 07/31/2016    anxiety induced no meds last seizure 4 years ago    Tailbone injury 2015    patient broke tailbone    Thyroid disease     borderline low no meds    TMJ (dislocation of temporomandibular joint)     Trigeminal neuralgia     Wears contact lenses        SURGICAL HISTORY       Past Surgical History:   Procedure Laterality Date    [facility]MIC [facility]OCUS  [facility]    ANKLE FRACTURE SURGERY Right 2020    RIGHT ANKLE OPEN REDUCTION INTERNAL FIXATION AND DELTOID LIGAMENT REPAIR performed by Dona Diaz DPM at [facility]  2016    EP Study    [facility]IZATION  [facility]    [facility]     SECTION N/A 2020     SECTION performed by Moose Bueno MD at [facility] [address], LAPAROSCOPIC N/A 2021    CHOLECYSTECTOMY LAPAROSCOPIC ROBOTIC performed by Amol Leblanc MD at [address]  2015    [facility]    INSERTABLE CARDIAC MONITOR      UPPER GASTROINTESTINAL ENDOSCOPY  2020    Dr. Rg Henderson  2015    [place] [address] EXTRACTION         CURRENT MEDICATIONS       Previous Medications    ALBUTEROL (PROVENTIL) (2.5 MG/3ML) 0.083% NEBULIZER SOLUTION    Take 3 mLs by nebulization every 6 hours as needed for Wheezing    ALBUTEROL SULFATE  (90 BASE) MCG/ACT INHALER    Inhale 2 puffs into the lungs every 4 hours as needed for Wheezing    ARIPIPRAZOLE (ABILIFY) 15 MG TABLET    Take 15 mg by mouth nightly    ASCORBIC ACID (VITAMIN C) 500 MG CAPS    Take 500 mg by mouth 2 times daily    AZELASTINE (ASTELIN) 0.1 % NASAL SPRAY    1 spray by Nasal route 2 times daily Use in each nostril as directed    AZITHROMYCIN (ZITHROMAX) 250 MG TABLET    Take 2 tabs po on day one. Take 1 tab po daily on days 2-5.     BUSPIRONE (BUSPAR) 15 MG TABLET    Take 45 mg by mouth nightly     CEFDINIR (OMNICEF) 300 MG CAPSULE    Take 1 capsule by mouth 2 times daily for 7 days    CETIRIZINE (ZYRTEC) 10 MG TABLET    Take 1 tablet by mouth daily    CLOTRIMAZOLE-BETAMETHASONE (LOTRISONE) 1-0.05 % CREAM    Apply topically 2 times daily    DEXAMETHASONE (DECADRON) 6 MG TABLET    Take 1 tablet by mouth 2 times daily (with meals) for 10 days    DULOXETINE (CYMBALTA) 30 MG EXTENDED RELEASE CAPSULE    Take 1 capsule by mouth daily    FLUTICASONE (FLONASE) 50 MCG/ACT NASAL SPRAY    1 spray by Each Nostril route daily    GUAIFENESIN-DEXTROMETHORPHAN (ROBITUSSIN DM) 100-10 MG/5ML SYRUP    Take 5 mLs by mouth 3 times daily as needed for Cough    NYSTATIN (MYCOSTATIN) 673309 UNIT/GM CREAM    Apply topically 2 times daily. OMEPRAZOLE (PRILOSEC) 40 MG DELAYED RELEASE CAPSULE    TAKE 1 CAPSULE BY MOUTH TWO TIMES A DAY     ONDANSETRON (ZOFRAN-ODT) 4 MG DISINTEGRATING TABLET    Take 1 tablet by mouth 3 times daily as needed for Nausea or Vomiting    OXCARBAZEPINE (TRILEPTAL) 600 MG TABLET        QUETIAPINE (SEROQUEL) 300 MG TABLET    Take 300 mg by mouth nightly    QUETIAPINE (SEROQUEL) 50 MG TABLET    Take 50 mg by mouth every morning (before breakfast)    SERTRALINE (ZOLOFT) 25 MG TABLET    Take 25 mg by mouth daily    SERTRALINE (ZOLOFT) 50 MG TABLET    Take 50 mg by mouth nightly     TOPIRAMATE (TOPAMAX) 25 MG TABLET    TAKE 1 TABLET BY MOUTH TWO TIMES A DAY    VITAMIN D3 (CHOLECALCIFEROL) 25 MCG (1000 UT) TABS TABLET    Take 2 tablets by mouth daily    ZINC 50 MG CAPS    Take 100 mg by mouth nightly       ALLERGIES       Allergies   Allergen Reactions    Dilaudid [Hydromorphone Hcl]      hallucination    Flexeril [Cyclobenzaprine] Other (See Comments)     Hallucinations    Keflex [Cephalexin] Other (See Comments)     Lose cognitive functions.      Tessalon [Benzonatate] Other (See Comments)     psychosis    Augmentin [Amoxicillin-Pot Clavulanate] Rash    Lorabid [Loracarbef] Hives, Swelling and Rash    Minocycline Itching, Swelling and Rash       FAMILY HISTORY       Family History   Problem Relation Age of Onset    Anxiety Disorder Mother     Diabetes Mother     Anxiety Disorder Father     Bipolar Disorder Father     High Blood Pressure Father     Mental Illness Father     Parkinsonism Father         Early-Onset    Depression Paternal Aunt     Cancer Paternal Uncle         multiple myeloma    Depression Paternal Uncle     Cancer Maternal Grandmother         breast    Ovarian Cancer Maternal Grandmother     Diabetes Maternal Grandmother     Depression Maternal Grandfather     Heart Attack Maternal Grandfather     Cancer Paternal Grandfather         lung    No Known Problems Brother         Gaviria disease    No Known Problems Paternal Grandmother     Lupus Maternal Aunt         SOCIAL HISTORY       Social History     Socioeconomic History    Marital status:      Spouse name: Wu Horn    Number of children: 1    Years of education: 15    Highest education level: High school graduate   Occupational History    None   Tobacco Use    Smoking status: Current Every Day Smoker     Packs/day: 0.50     Years: 1.00     Pack years: 0.50     Types: Cigarettes     Start date: 6/30/2018    Smokeless tobacco: Never Used   Vaping Use    Vaping Use: Former    Substances: Always   Substance and Sexual Activity    Alcohol use: Never     Alcohol/week: 1.0 standard drink     Types: 1 Cans of beer per week     Comment: occasional    Drug use: Yes     Types: Marijuana Don Safer)     Comment: MEDICAL [facility]  08/18/21    Sexual activity: Yes     Partners: Male   Other Topics Concern    None   Social History Narrative    None     Social Determinants of Health     Financial Resource Strain: Low Risk     Difficulty of Paying Living Expenses: Not hard at all   Food Insecurity: No Food Insecurity    Worried About [address] [employer] in the Last Year: Never true    [place] of Food in the Last Year: Never true   Transportation Needs: No Transportation Needs    Lack of Transportation (Medical):  No    Lack of Transportation (Non-Medical): No   Physical Activity:     Days of Exercise per Week: Not on file    Minutes of Exercise per Session: Not on file   Stress:     Feeling of Stress : Not on file   Social Connections:     Frequency of Communication with Friends and Family: Not on file    Frequency of Social Gatherings with Friends and Family: Not on file    Attends [employer] Services: Not on file    Active Member of [address] [place] [address] or Organizations: Not on file    Attends Club or Organization Meetings: Not on file    Marital Status: Not on file   Intimate Partner Violence:     Fear of Current or Ex-Partner: Not on file    Emotionally Abused: Not on file    Physically Abused: Not on file    Sexually Abused: Not on file   Housing Stability:     Unable to Pay for Housing in the Last Year: Not on file    Number of [place] in the Last Year: Not on file    Unstable Housing in the Last Year: Not on file       REVIEW OF SYSTEMS     Review of Systems   Constitutional: Positive for fatigue. Negative for appetite change, chills, fever and unexpected weight change. HENT: Positive for ear pain, sinus pressure, sinus pain and sore throat. Negative for congestion and rhinorrhea. Eyes: Negative for pain and visual disturbance. Respiratory: Negative for cough, shortness of breath and wheezing. Cardiovascular: Negative for chest pain, palpitations and leg swelling. Gastrointestinal: Negative for abdominal pain, blood in stool, constipation, diarrhea, nausea and vomiting. Genitourinary: Negative for dysuria, frequency and hematuria. Musculoskeletal: Negative for arthralgias, joint swelling and neck stiffness. Skin: Negative for rash. Neurological: Negative for dizziness, syncope, weakness, light-headedness and headaches. Hematological: Does not bruise/bleed easily. Except as noted above the remainder of the review of systems was reviewed and is negative.   SCREENINGS                        PHYSICAL EXAM    (up to 7 for level 4, 8 or more for level 5)     ED Triage Vitals [01/22/22 1603]   BP Temp Temp Source Pulse Resp SpO2 Height Weight   (!) 149/98 98.4 °F (36.9 °C) Oral 112 20 96 % 5' 10\" (1.778 m) (!) 315 lb (142.9 kg)       Physical Exam  Vitals and nursing note reviewed. Constitutional:       Appearance: She is well-developed. HENT:      Head: Normocephalic and atraumatic. Right Ear: Tenderness present. No mastoid tenderness. Tympanic membrane is erythematous and bulging. Left Ear: Tenderness present. No mastoid tenderness. Tympanic membrane is erythematous and bulging. Eyes:      Conjunctiva/sclera: Conjunctivae normal.      Pupils: Pupils are equal, round, and reactive to light. Cardiovascular:      Rate and Rhythm: Normal rate and regular rhythm. Heart sounds: Normal heart sounds. No murmur heard. No gallop. Pulmonary:      Effort: Pulmonary effort is normal. No respiratory distress. Breath sounds: Normal breath sounds. No wheezing or rales. Abdominal:      General: Bowel sounds are normal.      Palpations: Abdomen is soft. Musculoskeletal:         General: Normal range of motion. Cervical back: Normal range of motion. Skin:     General: Skin is warm and dry. Neurological:      Mental Status: She is alert and oriented to person, place, and time. DIAGNOSTIC RESULTS     EKG:(none if blank)  All EKGs are interpreted by the Emergency Department Physician who either signs or Co-signs this chart in the absence of a cardiologist.        RADIOLOGY: (none if blank)   I directly visualized the following images and reviewed the radiologist interpretations. Interpretation per the Radiologist below, if available at the time of this note:  No orders to display       LABS:  Labs Reviewed - No data to display    All other labs were within normal range or not returned as of this dictation.   Please note, any cultures that may have been sent were not resulted at the time of this patient visit. EMERGENCY DEPARTMENT COURSE and Medical Decision Making:     Vitals:    Vitals:    01/22/22 1603   BP: (!) 149/98   Pulse: 112   Resp: 20   Temp: 98.4 °F (36.9 °C)   TempSrc: Oral   SpO2: 96%   Weight: (!) 315 lb (142.9 kg)   Height: 5' 10\" (1.778 m)       PROCEDURES: (None if blank)  Procedures       MDM  Number of Diagnoses or Management Options  Bilateral acute serous otitis media, recurrence not specified: new, no workup  Risk of Complications, Morbidity, and/or Mortality  Presenting problems: minimal  Diagnostic procedures: minimal  Management options: minimal      Patient presents to ER with complaint of bilateral ear pain. Patient was started on Omnicef yesterday. Patient has multiple antibiotic allergies and had also received azithromycin at the beginning of January. Patient was discharged home with ibuprofen. Patient to continue antibiotics as ordered. Strict return precautions and follow up instructions were discussed with the patient with which the patient agrees    ED Medications administered this visit:  Medications - No data to display      FINAL IMPRESSION      1. Bilateral acute serous otitis media, recurrence not specified    2.  Gastroesophageal reflux disease without esophagitis          DISPOSITION/PLAN   DISPOSITION Decision To Discharge 01/22/2022 04:12:10 PM      PATIENT REFERRED TO:  LEVI Martell CNP  [address] [facility] [place]:  New Prescriptions    IBUPROFEN (ADVIL;MOTRIN) 800 MG TABLET    Take 1 tablet by mouth 4 times daily as needed for Pain              LEVI Reynolds CNP (electronically signed)           LEVI Reynolds CNP  01/22/22 5716 PAST SURGICAL HISTORY:  Cataracts, bilateral     Elective surgery heart recorder  removed 2018    H/O:  section     History of Mohs surgery for squamous cell carcinoma in situ of skin nose    History of salpingo-oophorectomy     Surgery, elective right eye corner reconstruction x 7

## 2024-06-12 NOTE — H&P ADULT - NSICDXPASTMEDICALHX_GEN_ALL_CORE_FT
PAST MEDICAL HISTORY:  Cancer of skin, squamous cell     DM2 (diabetes mellitus, type 2)     H/O: depression     HTN (hypertension)     PAF (paroxysmal atrial fibrillation)
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
## 2024-06-12 NOTE — ED ADULT NURSE NOTE - NSFALLUNIVINTERV_ED_ALL_ED
Bed/Stretcher in lowest position, wheels locked, appropriate side rails in place/Call bell, personal items and telephone in reach/Instruct patient to call for assistance before getting out of bed/chair/stretcher/Non-slip footwear applied when patient is off stretcher/[place] to call system/Physically safe environment - no spills, clutter or unnecessary equipment/Purposeful proactive rounding/Room/bathroom lighting operational, light cord in reach
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
## 2024-06-12 NOTE — H&P ADULT - ASSESSMENT
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
## 2024-06-13 ENCOUNTER — TRANSCRIPTION ENCOUNTER (OUTPATIENT)
Age: 79
End: 2024-06-13

## 2024-06-13 DIAGNOSIS — E11.65 TYPE 2 DIABETES MELLITUS WITH HYPERGLYCEMIA: ICD-10-CM

## 2024-06-13 DIAGNOSIS — S05.32XA OCULAR LACERATION WITHOUT PROLAPSE OR LOSS OF INTRAOCULAR TISSUE, LEFT EYE, INITIAL ENCOUNTER: ICD-10-CM

## 2024-06-13 DIAGNOSIS — F10.10 ALCOHOL ABUSE, UNCOMPLICATED: ICD-10-CM

## 2024-06-13 DIAGNOSIS — N18.9 CHRONIC KIDNEY DISEASE, UNSPECIFIED: ICD-10-CM

## 2024-06-13 DIAGNOSIS — F10.939 ALCOHOL USE, UNSPECIFIED WITH WITHDRAWAL, UNSPECIFIED: ICD-10-CM

## 2024-06-13 DIAGNOSIS — F32.A DEPRESSION, UNSPECIFIED: ICD-10-CM

## 2024-06-13 DIAGNOSIS — I48.0 PAROXYSMAL ATRIAL FIBRILLATION: ICD-10-CM

## 2024-06-13 LAB
A1C WITH ESTIMATED AVERAGE GLUCOSE RESULT: 6 % — HIGH (ref 4–5.6)
ANION GAP SERPL CALC-SCNC: 10 MMOL/L — SIGNIFICANT CHANGE UP (ref 7–14)
APTT BLD: 28.4 SEC — SIGNIFICANT CHANGE UP (ref 24.5–35.6)
BASOPHILS # BLD AUTO: 0.09 K/UL — SIGNIFICANT CHANGE UP (ref 0–0.2)
BASOPHILS NFR BLD AUTO: 1 % — SIGNIFICANT CHANGE UP (ref 0–2)
BUN SERPL-MCNC: 16 MG/DL — SIGNIFICANT CHANGE UP (ref 7–23)
CALCIUM SERPL-MCNC: 8.7 MG/DL — SIGNIFICANT CHANGE UP (ref 8.4–10.5)
CHLORIDE SERPL-SCNC: 103 MMOL/L — SIGNIFICANT CHANGE UP (ref 98–107)
CO2 SERPL-SCNC: 27 MMOL/L — SIGNIFICANT CHANGE UP (ref 22–31)
CREAT SERPL-MCNC: 1.26 MG/DL — SIGNIFICANT CHANGE UP (ref 0.5–1.3)
EGFR: 43 ML/MIN/1.73M2 — LOW
EOSINOPHIL # BLD AUTO: 0.26 K/UL — SIGNIFICANT CHANGE UP (ref 0–0.5)
EOSINOPHIL NFR BLD AUTO: 2.9 % — SIGNIFICANT CHANGE UP (ref 0–6)
ESTIMATED AVERAGE GLUCOSE: 126 — SIGNIFICANT CHANGE UP
GLUCOSE BLDC GLUCOMTR-MCNC: 109 MG/DL — HIGH (ref 70–99)
GLUCOSE BLDC GLUCOMTR-MCNC: 128 MG/DL — HIGH (ref 70–99)
GLUCOSE BLDC GLUCOMTR-MCNC: 164 MG/DL — HIGH (ref 70–99)
GLUCOSE BLDC GLUCOMTR-MCNC: 178 MG/DL — HIGH (ref 70–99)
GLUCOSE BLDC GLUCOMTR-MCNC: 182 MG/DL — HIGH (ref 70–99)
GLUCOSE BLDC GLUCOMTR-MCNC: 88 MG/DL — SIGNIFICANT CHANGE UP (ref 70–99)
GLUCOSE SERPL-MCNC: 80 MG/DL — SIGNIFICANT CHANGE UP (ref 70–99)
HCT VFR BLD CALC: 33.3 % — LOW (ref 34.5–45)
HGB BLD-MCNC: 11.1 G/DL — LOW (ref 11.5–15.5)
IANC: 5.2 K/UL — SIGNIFICANT CHANGE UP (ref 1.8–7.4)
IMM GRANULOCYTES NFR BLD AUTO: 0.4 % — SIGNIFICANT CHANGE UP (ref 0–0.9)
INR BLD: 1 RATIO — SIGNIFICANT CHANGE UP (ref 0.85–1.18)
LYMPHOCYTES # BLD AUTO: 2.22 K/UL — SIGNIFICANT CHANGE UP (ref 1–3.3)
LYMPHOCYTES # BLD AUTO: 24.5 % — SIGNIFICANT CHANGE UP (ref 13–44)
MAGNESIUM SERPL-MCNC: 1.7 MG/DL — SIGNIFICANT CHANGE UP (ref 1.6–2.6)
MCHC RBC-ENTMCNC: 30.7 PG — SIGNIFICANT CHANGE UP (ref 27–34)
MCHC RBC-ENTMCNC: 33.3 GM/DL — SIGNIFICANT CHANGE UP (ref 32–36)
MCV RBC AUTO: 92.2 FL — SIGNIFICANT CHANGE UP (ref 80–100)
MONOCYTES # BLD AUTO: 1.24 K/UL — HIGH (ref 0–0.9)
MONOCYTES NFR BLD AUTO: 13.7 % — SIGNIFICANT CHANGE UP (ref 2–14)
MRSA PCR RESULT.: SIGNIFICANT CHANGE UP
NEUTROPHILS # BLD AUTO: 5.2 K/UL — SIGNIFICANT CHANGE UP (ref 1.8–7.4)
NEUTROPHILS NFR BLD AUTO: 57.5 % — SIGNIFICANT CHANGE UP (ref 43–77)
NRBC # BLD: 0 /100 WBCS — SIGNIFICANT CHANGE UP (ref 0–0)
NRBC # FLD: 0 K/UL — SIGNIFICANT CHANGE UP (ref 0–0)
PHOSPHATE SERPL-MCNC: 3.3 MG/DL — SIGNIFICANT CHANGE UP (ref 2.5–4.5)
PLATELET # BLD AUTO: 288 K/UL — SIGNIFICANT CHANGE UP (ref 150–400)
POTASSIUM SERPL-MCNC: 3.6 MMOL/L — SIGNIFICANT CHANGE UP (ref 3.5–5.3)
POTASSIUM SERPL-SCNC: 3.6 MMOL/L — SIGNIFICANT CHANGE UP (ref 3.5–5.3)
PROTHROM AB SERPL-ACNC: 11.2 SEC — SIGNIFICANT CHANGE UP (ref 9.5–13)
RBC # BLD: 3.61 M/UL — LOW (ref 3.8–5.2)
RBC # FLD: 14.9 % — HIGH (ref 10.3–14.5)
RH IG SCN BLD-IMP: NEGATIVE — SIGNIFICANT CHANGE UP
S AUREUS DNA NOSE QL NAA+PROBE: SIGNIFICANT CHANGE UP
SODIUM SERPL-SCNC: 140 MMOL/L — SIGNIFICANT CHANGE UP (ref 135–145)
VANCOMYCIN FLD-MCNC: 7.6 UG/ML — SIGNIFICANT CHANGE UP
WBC # BLD: 9.05 K/UL — SIGNIFICANT CHANGE UP (ref 3.8–10.5)
WBC # FLD AUTO: 9.05 K/UL — SIGNIFICANT CHANGE UP (ref 3.8–10.5)

## 2024-06-13 RX ORDER — INSULIN DETEMIR 100/ML (3)
12 INSULIN PEN (ML) SUBCUTANEOUS DAILY
Refills: 0 | Status: DISCONTINUED | OUTPATIENT
Start: 2024-06-13 | End: 2024-06-14

## 2024-06-13 RX ORDER — ATORVASTATIN CALCIUM 80 MG/1
80 TABLET, FILM COATED ORAL AT BEDTIME
Refills: 0 | Status: DISCONTINUED | OUTPATIENT
Start: 2024-06-13 | End: 2024-06-14

## 2024-06-13 RX ORDER — INSULIN LISPRO 100/ML
VIAL (ML) SUBCUTANEOUS AT BEDTIME
Refills: 0 | Status: DISCONTINUED | OUTPATIENT
Start: 2024-06-13 | End: 2024-06-14

## 2024-06-13 RX ORDER — DILTIAZEM HCL 120 MG
300 CAPSULE, EXT RELEASE 24 HR ORAL DAILY
Refills: 0 | Status: DISCONTINUED | OUTPATIENT
Start: 2024-06-13 | End: 2024-06-14

## 2024-06-13 RX ORDER — INSULIN LISPRO 100/ML
VIAL (ML) SUBCUTANEOUS
Refills: 0 | Status: DISCONTINUED | OUTPATIENT
Start: 2024-06-13 | End: 2024-06-14

## 2024-06-13 RX ORDER — INSULIN DETEMIR 100/ML (3)
16 INSULIN PEN (ML) SUBCUTANEOUS DAILY
Refills: 0 | Status: DISCONTINUED | OUTPATIENT
Start: 2024-06-13 | End: 2024-06-13

## 2024-06-13 RX ORDER — ACETAZOLAMIDE 250 MG/1
500 TABLET ORAL ONCE
Refills: 0 | Status: COMPLETED | OUTPATIENT
Start: 2024-06-13 | End: 2024-06-13

## 2024-06-13 RX ORDER — CEFEPIME 1 G/1
1000 INJECTION, POWDER, FOR SOLUTION INTRAMUSCULAR; INTRAVENOUS DAILY
Refills: 0 | Status: DISCONTINUED | OUTPATIENT
Start: 2024-06-13 | End: 2024-06-14

## 2024-06-13 RX ORDER — ARIPIPRAZOLE 15 MG/1
2 TABLET ORAL DAILY
Refills: 0 | Status: DISCONTINUED | OUTPATIENT
Start: 2024-06-13 | End: 2024-06-14

## 2024-06-13 RX ORDER — AMIODARONE HYDROCHLORIDE 400 MG/1
50 TABLET ORAL DAILY
Refills: 0 | Status: DISCONTINUED | OUTPATIENT
Start: 2024-06-13 | End: 2024-06-14

## 2024-06-13 RX ORDER — FOLIC ACID 0.8 MG
1 TABLET ORAL DAILY
Refills: 0 | Status: DISCONTINUED | OUTPATIENT
Start: 2024-06-12 | End: 2024-06-14

## 2024-06-13 RX ORDER — PREGABALIN 225 MG/1
1000 CAPSULE ORAL DAILY
Refills: 0 | Status: DISCONTINUED | OUTPATIENT
Start: 2024-06-13 | End: 2024-06-14

## 2024-06-13 RX ORDER — PANTOPRAZOLE SODIUM 20 MG/1
40 TABLET, DELAYED RELEASE ORAL
Refills: 0 | Status: DISCONTINUED | OUTPATIENT
Start: 2024-06-13 | End: 2024-06-14

## 2024-06-13 RX ORDER — CHLORHEXIDINE GLUCONATE 213 G/1000ML
1 SOLUTION TOPICAL DAILY
Refills: 0 | Status: DISCONTINUED | OUTPATIENT
Start: 2024-06-13 | End: 2024-06-14

## 2024-06-13 RX ORDER — FENTANYL CITRATE 50 UG/ML
50 INJECTION INTRAVENOUS
Refills: 0 | Status: DISCONTINUED | OUTPATIENT
Start: 2024-06-13 | End: 2024-06-13

## 2024-06-13 RX ORDER — VANCOMYCIN HCL 1 G
750 VIAL (EA) INTRAVENOUS EVERY 24 HOURS
Refills: 0 | Status: DISCONTINUED | OUTPATIENT
Start: 2024-06-13 | End: 2024-06-14

## 2024-06-13 RX ORDER — CHOLECALCIFEROL (VITAMIN D3) 125 MCG
1000 CAPSULE ORAL DAILY
Refills: 0 | Status: DISCONTINUED | OUTPATIENT
Start: 2024-06-13 | End: 2024-06-14

## 2024-06-13 RX ORDER — THIAMINE MONONITRATE (VIT B1) 100 MG
100 TABLET ORAL DAILY
Refills: 0 | Status: DISCONTINUED | OUTPATIENT
Start: 2024-06-12 | End: 2024-06-14

## 2024-06-13 RX ORDER — FENTANYL CITRATE 50 UG/ML
25 INJECTION INTRAVENOUS
Refills: 0 | Status: DISCONTINUED | OUTPATIENT
Start: 2024-06-13 | End: 2024-06-13

## 2024-06-13 RX ORDER — ACETAZOLAMIDE 250 MG/1
500 TABLET ORAL ONCE
Refills: 0 | Status: COMPLETED | OUTPATIENT
Start: 2024-06-14 | End: 2024-06-14

## 2024-06-13 RX ADMIN — ATORVASTATIN CALCIUM 80 MILLIGRAM(S): 80 TABLET, FILM COATED ORAL at 22:29

## 2024-06-13 RX ADMIN — Medication 10 MILLIGRAM(S): at 11:20

## 2024-06-13 RX ADMIN — Medication 250 MILLIGRAM(S): at 14:00

## 2024-06-13 RX ADMIN — Medication 20 MILLIGRAM(S): at 19:05

## 2024-06-13 RX ADMIN — AMIODARONE HYDROCHLORIDE 50 MILLIGRAM(S): 400 TABLET ORAL at 06:04

## 2024-06-13 RX ADMIN — Medication 1: at 12:42

## 2024-06-13 RX ADMIN — ACETAZOLAMIDE 500 MILLIGRAM(S): 250 TABLET ORAL at 10:51

## 2024-06-13 RX ADMIN — ARIPIPRAZOLE 2 MILLIGRAM(S): 15 TABLET ORAL at 11:20

## 2024-06-13 RX ADMIN — PREGABALIN 1000 MICROGRAM(S): 225 CAPSULE ORAL at 11:20

## 2024-06-13 RX ADMIN — Medication 300 MILLIGRAM(S): at 06:04

## 2024-06-13 RX ADMIN — Medication 100 MILLIGRAM(S): at 11:20

## 2024-06-13 RX ADMIN — CHLORHEXIDINE GLUCONATE 1 APPLICATION(S): 213 SOLUTION TOPICAL at 17:10

## 2024-06-13 RX ADMIN — Medication 1 MILLIGRAM(S): at 11:19

## 2024-06-13 RX ADMIN — Medication 1000 UNIT(S): at 11:20

## 2024-06-13 RX ADMIN — Medication 8 UNIT(S): at 12:52

## 2024-06-13 RX ADMIN — CEFEPIME 100 MILLIGRAM(S): 1 INJECTION, POWDER, FOR SOLUTION INTRAMUSCULAR; INTRAVENOUS at 11:39

## 2024-06-13 RX ADMIN — Medication 1 TABLET(S): at 11:20
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
## 2024-06-13 NOTE — ASU PREOP CHECKLIST - BP NONINVASIVE SYSTOLIC (MM HG)
Other (Free Text): Continue Humira and [id number], samples given Note Text (......Xxx Chief Complaint.): This diagnosis correlates with the Detail Level: Zone 154

## 2024-06-13 NOTE — DISCHARGE NOTE PROVIDER - NSDCFUADDAPPT_GEN_ALL_CORE_FT
Follow-up with your ophthalmologist or with [facility] Department of Ophthalmology in 1 week after discharge by 6/21/24 at:  [address] [facility] [address]  [place] [address]  [phone]

## 2024-06-13 NOTE — DISCHARGE NOTE PROVIDER - NSDCACTIVITY_GEN_ALL_CORE
Do not drive or operate machinery Do not drive or operate machinery/Follow Instructions Provided by your Surgical Team

## 2024-06-13 NOTE — DISCHARGE NOTE PROVIDER - NSDCMRMEDTOKEN_GEN_ALL_CORE_FT
Abilify 2 mg oral tablet: 1 tab(s) orally once a day in the morning  amiodarone 100 mg oral tablet: 0.5 tab(s) orally once a day  busPIRone 10 mg oral tablet: 1 tab(s) orally once a day in the morning  busPIRone 10 mg oral tablet: 2 tab(s) orally once a day (at bedtime)  cholecalciferol 25 mcg (1000 intl units) oral tablet: 1 tab(s) orally once a day  cyanocobalamin 1000 mcg oral tablet: 1 tab(s) orally once a day  DilTIAZem (Eqv-Cardizem CD) 300 mg/24 hours oral capsule, extended release: 1 cap(s) orally once a day (at bedtime)  Dupixent Pre-filled Syringe 300 mg/2 mL subcutaneous solution: 300 milligram(s) subcutaneously every 2 weeks  Levemir 100 units/mL subcutaneous solution: 16 unit(s) subcutaneous once a day  metFORMIN 1000 mg oral tablet: 1 tab(s) orally 2 times a day  NexIUM 20 mg oral delayed release capsule: 1 cap(s) orally once a day  Potassium Chloride (Eqv-Klor-Con 10) 10 mEq oral tablet, extended release: 1 tab(s) orally 2 times a day  rosuvastatin 40 mg oral capsule: 1 cap(s) orally once a day (at bedtime)   Abilify 2 mg oral tablet: 1 tab(s) orally once a day in the morning  amiodarone 100 mg oral tablet: 0.5 tab(s) orally once a day  busPIRone 10 mg oral tablet: 1 tab(s) orally once a day in the morning  busPIRone 10 mg oral tablet: 2 tab(s) orally once a day (at bedtime)  cholecalciferol 25 mcg (1000 intl units) oral tablet: 1 tab(s) orally once a day  cyanocobalamin 1000 mcg oral tablet: 1 tab(s) orally once a day  DilTIAZem (Eqv-Cardizem CD) 300 mg/24 hours oral capsule, extended release: 1 cap(s) orally once a day (at bedtime)  Dupixent Pre-filled Syringe 300 mg/2 mL subcutaneous solution: 300 milligram(s) subcutaneously every 2 weeks  Levemir 100 units/mL subcutaneous solution: 16 unit(s) subcutaneous once a day  metFORMIN 1000 mg oral tablet: 1 tab(s) orally 2 times a day  Multiple Vitamins oral tablet: 1 tab(s) orally once a day  NexIUM 20 mg oral delayed release capsule: 1 cap(s) orally once a day  ofloxacin 0.3% ophthalmic solution: 1 drop(s) in the left eye 4 times a day  Potassium Chloride (Eqv-Klor-Con 10) 10 mEq oral tablet, extended release: 1 tab(s) orally 2 times a day  prednisoLONE acetate 1% ophthalmic suspension: 1 drop(s) in the left eye 4 times a day  rosuvastatin 40 mg oral capsule: 1 cap(s) orally once a day (at bedtime)   Abilify 2 mg oral tablet: 1 tab(s) orally once a day in the morning  amiodarone 100 mg oral tablet: 0.5 tab(s) orally once a day  busPIRone 10 mg oral tablet: 1 tab(s) orally once a day in the morning  busPIRone 10 mg oral tablet: 2 tab(s) orally once a day (at bedtime)  cholecalciferol 25 mcg (1000 intl units) oral tablet: 1 tab(s) orally once a day  cyanocobalamin 1000 mcg oral tablet: 1 tab(s) orally once a day  DilTIAZem (Eqv-Cardizem CD) 300 mg/24 hours oral capsule, extended release: 1 cap(s) orally once a day (at bedtime)  Dupixent Pre-filled Syringe 300 mg/2 mL subcutaneous solution: 300 milligram(s) subcutaneously every 2 weeks  Levemir 100 units/mL subcutaneous solution: 16 unit(s) subcutaneous once a day  Levemir FlexTouch 100 units/mL subcutaneous solution: 15 unit(s) subcutaneous once a day  metFORMIN 1000 mg oral tablet: 1 tab(s) orally 2 times a day  Multiple Vitamins oral tablet: 1 tab(s) orally once a day  NexIUM 20 mg oral delayed release capsule: 1 cap(s) orally once a day  ofloxacin 0.3% ophthalmic solution: 1 drop(s) in the left eye 4 times a day  Potassium Chloride (Eqv-Klor-Con 10) 10 mEq oral tablet, extended release: 1 tab(s) orally 2 times a day  prednisoLONE acetate 1% ophthalmic suspension: 1 drop(s) in the left eye 4 times a day  rosuvastatin 40 mg oral capsule: 1 cap(s) orally once a day (at bedtime)   Abilify 2 mg oral tablet: 1 tab(s) orally once a day in the morning  amiodarone 100 mg oral tablet: 0.5 tab(s) orally once a day  busPIRone 10 mg oral tablet: 1 tab(s) orally once a day in the morning  busPIRone 10 mg oral tablet: 2 tab(s) orally once a day (at bedtime)  cholecalciferol 25 mcg (1000 intl units) oral tablet: 1 tab(s) orally once a day  cyanocobalamin 1000 mcg oral tablet: 1 tab(s) orally once a day  DilTIAZem (Eqv-Cardizem CD) 300 mg/24 hours oral capsule, extended release: 1 cap(s) orally once a day (at bedtime)  Dupixent Pre-filled Syringe 300 mg/2 mL subcutaneous solution: 300 milligram(s) subcutaneously every 2 weeks  Insulin Pen Needles, 4mm: 1 application subcutaneously 4 times a day. ** Use with insulin pen **  lancets: 1 application subcutaneously 4 times a day  Levemir 100 units/mL subcutaneous solution: 16 unit(s) subcutaneous once a day  Levemir FlexTouch 100 units/mL subcutaneous solution: 15 unit(s) subcutaneous once a day  metFORMIN 1000 mg oral tablet: 1 tab(s) orally 2 times a day  Multiple Vitamins oral tablet: 1 tab(s) orally once a day  NexIUM 20 mg oral delayed release capsule: 1 cap(s) orally once a day  ofloxacin 0.3% ophthalmic solution: 1 drop(s) in the left eye 4 times a day  Potassium Chloride (Eqv-Klor-Con 10) 10 mEq oral tablet, extended release: 1 tab(s) orally 2 times a day  prednisoLONE acetate 1% ophthalmic suspension: 1 drop(s) in the left eye 4 times a day  rosuvastatin 40 mg oral capsule: 1 cap(s) orally once a day (at bedtime)
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
## 2024-06-13 NOTE — DISCHARGE NOTE PROVIDER - NSFOLLOWUPCLINICS_GEN_ALL_ED_FT
[facility]  Ophthalmology  [address] [place] [address], [facility] [address]  [place] [address]  Phone: [phone]  Fax:
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
## 2024-06-13 NOTE — PATIENT PROFILE ADULT - FALL HARM RISK - FALL HARM RISK
Other Mohs Method Verbiage: An incision at a 45 degree angle following the standard Mohs approach was done and the specimen was harvested as a microscopic controlled layer.
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
## 2024-06-13 NOTE — PROGRESS NOTE ADULT - TIME BILLING
Time-based billing (NON-critical care).     50 minutes spent on total encounter; more than 50% of the visit was spent counseling and / or coordinating care by the attending physician.  The necessity of the time spent during the encounter on this date of service was due to:     documentation in [place], reviewing chart and coordinating care with patient/ACP and interdisciplinary staff (such as , social workers, etc) as well as reviewing vitals, laboratory data, radiology, medication list, consultants' recommendations and prior records. Interventions were performed as documented above.

## 2024-06-13 NOTE — PATIENT PROFILE ADULT - FALL HARM RISK - HARM RISK INTERVENTIONS
Assistance with ambulation/Assistance OOB with selected safe patient handling equipment/Communicate Risk of Fall with Harm to all staff/Monitor for mental status changes/Monitor gait and stability/Reinforce activity limits and safety measures with patient and family/Tailored Fall Risk Interventions/Toileting schedule using arm’s reach rule for commode and bathroom/Use of alarms - bed, chair and/or voice tab/Visual Cue: Yellow wristband and red socks/Bed in lowest position, wheels locked, appropriate side rails in place/Call bell, personal items and telephone in reach/Instruct patient to call for assistance before getting out of bed or chair/Non-slip footwear when patient is out of bed/[place] to call system/Physically safe environment - no spills, clutter or unnecessary equipment/Purposeful Proactive Rounding/Room/bathroom lighting operational, light cord in reach

## 2024-06-13 NOTE — PATIENT PROFILE ADULT - VISION (WITH CORRECTIVE LENSES IF THE PATIENT USUALLY WEARS THEM):
shoulder Partially impaired: cannot see medication labels or newsprint, but can see obstacles in path, and the surrounding layout; can count fingers at arm's length
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
## 2024-06-13 NOTE — DISCHARGE NOTE PROVIDER - ATTENDING DISCHARGE PHYSICAL EXAMINATION:
.  VITAL SIGNS:  T(C): 37.1 (06-14-24 @ 10:34), Max: 37.1 (06-14-24 @ 10:34)  T(F): 98.7 (06-14-24 @ 10:34), Max: 98.7 (06-14-24 @ 10:34)  HR: 88 (06-14-24 @ 10:34) (72 - 89)  BP: 132/58 (06-14-24 @ 10:34) (124/58 - 133/64)  BP(mean): 79 (06-14-24 @ 02:10) (79 - 79)  RR: 17 (06-14-24 @ 10:34) (17 - 18)  SpO2: 98% (06-14-24 @ 10:34) (95% - 100%)  Wt(kg): --    PHYSICAL EXAM:    Constitutional: resting comfortably in bed; NAD  Head: NC/AT, left eye patch  ENT: no nasal discharge; uvula midline, no oropharyngeal erythema or exudates; MMM  Respiratory: CTA B/L; no W/R/R, no retractions  Cardiac: +S1/S2; RRR; no M/R/G; PMI non-displaced  Gastrointestinal: abdomen soft, NT/ND; no rebound or guarding; +BSx4; no CVAT B/L  Extremities: WWP, no clubbing or cyanosis; no peripheral edema  Musculoskeletal: NROM x4; no joint swelling, tenderness or erythema  Vascular: 2+ radial, femoral, DP/PT pulses B/L  Dermatologic: skin warm, dry and intact; no rashes, wounds, or scars  Neurologic: AAOx3; CNII-XII grossly intact; no focal deficits  Psychiatric: affect and characteristics of appearance, verbalizations, behaviors are appropriate

## 2024-06-14 ENCOUNTER — TRANSCRIPTION ENCOUNTER (OUTPATIENT)
Age: 79
End: 2024-06-14

## 2024-06-14 VITALS
SYSTOLIC BLOOD PRESSURE: 126 MMHG | HEART RATE: 82 BPM | RESPIRATION RATE: 18 BRPM | OXYGEN SATURATION: 98 % | DIASTOLIC BLOOD PRESSURE: 65 MMHG | TEMPERATURE: 98 F

## 2024-06-14 LAB
ALBUMIN SERPL ELPH-MCNC: 3.4 G/DL — SIGNIFICANT CHANGE UP (ref 3.3–5)
ALP SERPL-CCNC: 94 U/L — SIGNIFICANT CHANGE UP (ref 40–120)
ALT FLD-CCNC: 5 U/L — SIGNIFICANT CHANGE UP (ref 4–33)
ANION GAP SERPL CALC-SCNC: 15 MMOL/L — HIGH (ref 7–14)
AST SERPL-CCNC: 22 U/L — SIGNIFICANT CHANGE UP (ref 4–32)
BILIRUB SERPL-MCNC: 0.7 MG/DL — SIGNIFICANT CHANGE UP (ref 0.2–1.2)
BUN SERPL-MCNC: 16 MG/DL — SIGNIFICANT CHANGE UP (ref 7–23)
CALCIUM SERPL-MCNC: 8.7 MG/DL — SIGNIFICANT CHANGE UP (ref 8.4–10.5)
CHLORIDE SERPL-SCNC: 104 MMOL/L — SIGNIFICANT CHANGE UP (ref 98–107)
CO2 SERPL-SCNC: 20 MMOL/L — LOW (ref 22–31)
CREAT SERPL-MCNC: 1.54 MG/DL — HIGH (ref 0.5–1.3)
EGFR: 34 ML/MIN/1.73M2 — LOW
GLUCOSE BLDC GLUCOMTR-MCNC: 141 MG/DL — HIGH (ref 70–99)
GLUCOSE BLDC GLUCOMTR-MCNC: 187 MG/DL — HIGH (ref 70–99)
GLUCOSE SERPL-MCNC: 152 MG/DL — HIGH (ref 70–99)
HCT VFR BLD CALC: 35.2 % — SIGNIFICANT CHANGE UP (ref 34.5–45)
HGB BLD-MCNC: 11.7 G/DL — SIGNIFICANT CHANGE UP (ref 11.5–15.5)
MAGNESIUM SERPL-MCNC: 1.8 MG/DL — SIGNIFICANT CHANGE UP (ref 1.6–2.6)
MCHC RBC-ENTMCNC: 31 PG — SIGNIFICANT CHANGE UP (ref 27–34)
MCHC RBC-ENTMCNC: 33.2 GM/DL — SIGNIFICANT CHANGE UP (ref 32–36)
MCV RBC AUTO: 93.1 FL — SIGNIFICANT CHANGE UP (ref 80–100)
NRBC # BLD: 0 /100 WBCS — SIGNIFICANT CHANGE UP (ref 0–0)
NRBC # FLD: 0.02 K/UL — HIGH (ref 0–0)
PHOSPHATE SERPL-MCNC: 3.7 MG/DL — SIGNIFICANT CHANGE UP (ref 2.5–4.5)
PLATELET # BLD AUTO: 302 K/UL — SIGNIFICANT CHANGE UP (ref 150–400)
POTASSIUM SERPL-MCNC: 3.2 MMOL/L — LOW (ref 3.5–5.3)
POTASSIUM SERPL-SCNC: 3.2 MMOL/L — LOW (ref 3.5–5.3)
PROT SERPL-MCNC: 6.5 G/DL — SIGNIFICANT CHANGE UP (ref 6–8.3)
RBC # BLD: 3.78 M/UL — LOW (ref 3.8–5.2)
RBC # FLD: 15.1 % — HIGH (ref 10.3–14.5)
SODIUM SERPL-SCNC: 139 MMOL/L — SIGNIFICANT CHANGE UP (ref 135–145)
WBC # BLD: 11.68 K/UL — HIGH (ref 3.8–10.5)
WBC # FLD AUTO: 11.68 K/UL — HIGH (ref 3.8–10.5)

## 2024-06-14 RX ORDER — POTASSIUM CHLORIDE 20 MEQ
40 PACKET (EA) ORAL ONCE
Refills: 0 | Status: COMPLETED | OUTPATIENT
Start: 2024-06-14 | End: 2024-06-14

## 2024-06-14 RX ORDER — PREDNISOLONE SODIUM PHOSPHATE 1 %
1 DROPS OPHTHALMIC (EYE)
Qty: 1 | Refills: 0
Start: 2024-06-14 | End: 2024-07-13

## 2024-06-14 RX ORDER — OFLOXACIN 0.3 %
1 DROPS OPHTHALMIC (EYE)
Qty: 1 | Refills: 0
Start: 2024-06-14 | End: 2024-07-13

## 2024-06-14 RX ORDER — INSULIN DETEMIR 100/ML (3)
15 INSULIN PEN (ML) SUBCUTANEOUS
Qty: 4.5 | Refills: 0
Start: 2024-06-14 | End: 2024-07-13

## 2024-06-14 RX ADMIN — Medication 100 MILLIGRAM(S): at 12:36

## 2024-06-14 RX ADMIN — AMIODARONE HYDROCHLORIDE 50 MILLIGRAM(S): 400 TABLET ORAL at 06:31

## 2024-06-14 RX ADMIN — PANTOPRAZOLE SODIUM 40 MILLIGRAM(S): 20 TABLET, DELAYED RELEASE ORAL at 06:31

## 2024-06-14 RX ADMIN — Medication 1000 UNIT(S): at 12:35

## 2024-06-14 RX ADMIN — CHLORHEXIDINE GLUCONATE 1 APPLICATION(S): 213 SOLUTION TOPICAL at 12:48

## 2024-06-14 RX ADMIN — ACETAZOLAMIDE 500 MILLIGRAM(S): 250 TABLET ORAL at 12:35

## 2024-06-14 RX ADMIN — Medication 40 MILLIEQUIVALENT(S): at 09:40

## 2024-06-14 RX ADMIN — Medication 10 MILLIGRAM(S): at 12:35

## 2024-06-14 RX ADMIN — Medication 12 UNIT(S): at 09:33

## 2024-06-14 RX ADMIN — Medication 1: at 09:39

## 2024-06-14 RX ADMIN — Medication 300 MILLIGRAM(S): at 06:32

## 2024-06-14 RX ADMIN — Medication 1 MILLIGRAM(S): at 12:37

## 2024-06-14 RX ADMIN — PREGABALIN 1000 MICROGRAM(S): 225 CAPSULE ORAL at 16:22

## 2024-06-14 RX ADMIN — Medication 1 TABLET(S): at 12:37

## 2024-06-14 RX ADMIN — ARIPIPRAZOLE 2 MILLIGRAM(S): 15 TABLET ORAL at 12:37

## 2024-06-14 NOTE — PROGRESS NOTE ADULT - ASSESSMENT
77 yo female POD1 s/p RG repair.     POD1 s/p RG repair   - Va 20/70, IOP 15 OS  - Fran negative, corneal sutures in place inferotemporally   - some hyphema still present, pupil distorted   - AC deep and formed   - Can discharge patient on ofloxacin 4 times daily, predforte 4 times daily to the left eye. Should wear shield at all times unless instilling drops. Can shower without shield.   - Follow up in 1 week at address below      Dr. Harris, chief resident     Outpatient follow-up: Patient should follow-up with his/her ophthalmologist or with [facility] Department of Ophthalmology upon discharge at the address below     [facility] Department of Ophthalmology  [address] [place] [address]. [address]  [place] [address]  [phone]
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

## 2024-06-14 NOTE — DIETITIAN INITIAL EVALUATION ADULT - PROBLEM SELECTOR PLAN 1
- reviewed ophthalmology consult  - continue cefepime  - check vancomycin level in the am (received 1 gram here and 1 at [place]) and dose by level  - plan is for OR
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
## 2024-06-14 NOTE — DISCHARGE NOTE NURSING/CASE MANAGEMENT/SOCIAL WORK - NSPROEXTENSIONSOFSELF_GEN_A_NUR
none ,DirectAddress_Unknown,[email or web address],DirectAddress_Unknown ,DirectAddress_Unknown,[email or web address],DirectAddress_Unknown

## 2024-06-14 NOTE — PROGRESS NOTE ADULT - SUBJECTIVE AND OBJECTIVE BOX
ANESTHESIA POSTOP CHECK    78y Female POSTOP DAY 1 S/P Repair of Left Eye Globe Rupture    Vital Signs Last 24 Hrs  T(C): 37.1 (14 Jun 2024 10:34), Max: 37.1 (14 Jun 2024 10:34)  T(F): 98.7 (14 Jun 2024 10:34), Max: 98.7 (14 Jun 2024 10:34)  HR: 88 (14 Jun 2024 10:34) (72 - 89)  BP: 132/58 (14 Jun 2024 10:34) (124/58 - 133/64)  BP(mean): 79 (14 Jun 2024 02:10) (79 - 79)  RR: 17 (14 Jun 2024 10:34) (17 - 18)  SpO2: 98% (14 Jun 2024 10:34) (95% - 100%)    Parameters below as of 14 Jun 2024 10:34  Patient On (Oxygen Delivery Method): room air      I&O's Summary    13 Jun 2024 07:01  -  14 Jun 2024 07:00  --------------------------------------------------------  IN: 240 mL / OUT: 0 mL / NET: 240 mL        [X] NO APPARENT ANESTHESIA COMPLICATIONS      Comments: 
[facility] DEPARTMENT OF OPHTHALMOLOGY  ------------------------------------------------------------------------------  Jesus Campos MD, PGY-2  Contact: TEAMS  ------------------------------------------------------------------------------    Interval History: No acute events overnight. POD1 sp RG repair.     MEDICATIONS  (STANDING):  aMIOdarone    Tablet 50 milliGRAM(s) Oral daily  ARIPiprazole 2 milliGRAM(s) Oral daily  atorvastatin 80 milliGRAM(s) Oral at bedtime  busPIRone 10 milliGRAM(s) Oral <User Schedule>  busPIRone 20 milliGRAM(s) Oral <User Schedule>  chlorhexidine 2% Cloths 1 Application(s) Topical daily  cholecalciferol 1000 Unit(s) Oral daily  cyanocobalamin 1000 MICROGram(s) Oral daily  dextrose 10% Bolus 125 milliLiter(s) IV Bolus once  dextrose 5%. 1000 milliLiter(s) (100 mL/Hr) IV Continuous <Continuous>  dextrose 5%. 1000 milliLiter(s) (50 mL/Hr) IV Continuous <Continuous>  dextrose 50% Injectable 25 Gram(s) IV Push once  dextrose 50% Injectable 12.5 Gram(s) IV Push once  diltiazem    milliGRAM(s) Oral daily  folic acid 1 milliGRAM(s) Oral daily  glucagon  Injectable 1 milliGRAM(s) IntraMuscular once  insulin detemir injectable (LEVEMIR) 12 Unit(s) SubCutaneous daily  insulin lispro (ADMELOG) corrective regimen sliding scale   SubCutaneous three times a day before meals  insulin lispro (ADMELOG) corrective regimen sliding scale   SubCutaneous at bedtime  multivitamin 1 Tablet(s) Oral daily  pantoprazole    Tablet 40 milliGRAM(s) Oral before breakfast  thiamine 100 milliGRAM(s) Oral daily    MEDICATIONS  (PRN):  dextrose Oral Gel 15 Gram(s) Oral once PRN Blood Glucose LESS THAN 70 milliGRAM(s)/deciliter  LORazepam   Injectable 2 milliGRAM(s) IV Push every 2 hours PRN Symptom-triggered: 2 point increase in CIWA -Ar score and a total score of 7 or LESS      VITALS: T(C): 37.1 (06-14-24 @ 10:34)  T(F): 98.7 (06-14-24 @ 10:34), Max: 98.7 (06-14-24 @ 10:34)  HR: 88 (06-14-24 @ 10:34) (72 - 89)  BP: 132/58 (06-14-24 @ 10:34) (124/58 - 133/64)  RR:  (17 - 18)  SpO2:  (95% - 100%)  Wt(kg): --  General: AAO x 3, appropriate mood and affect    Ophthalmology Exam:  Visual acuity (sc): 20/70 OS  Pupils: no APD by reverse  Ttono: 15 OS  Extraocular movements (EOMs): Full OU, no pain, no diplopia    Pen Light Exam (PLE)  External: Flat OS  Lids/Lashes/Lacrimal Ducts: Flat OS    Sclera/Conjunctiva: CRISTHIAN, injection OS  Cornea: danial negative, sutures in place inferotemporally OS  Anterior Chamber: D+F OS    Iris: distorted OS  Lens: PCIOL OS  
[facility] Division of Hospital Medicine  Radhika Tripathi MD  Available via MS Teams  Pager: [phone]    SUBJECTIVE / OVERNIGHT EVENTS:  seen in the PACU post op, pt report some eye discomfort    MEDICATIONS  (STANDING):  aMIOdarone    Tablet 50 milliGRAM(s) Oral daily  ARIPiprazole 2 milliGRAM(s) Oral daily  atorvastatin 80 milliGRAM(s) Oral at bedtime  busPIRone 10 milliGRAM(s) Oral <User Schedule>  busPIRone 20 milliGRAM(s) Oral <User Schedule>  cefepime   IVPB 1000 milliGRAM(s) IV Intermittent daily  cholecalciferol 1000 Unit(s) Oral daily  cyanocobalamin 1000 MICROGram(s) Oral daily  dextrose 10% Bolus 125 milliLiter(s) IV Bolus once  dextrose 5%. 1000 milliLiter(s) (100 mL/Hr) IV Continuous <Continuous>  dextrose 5%. 1000 milliLiter(s) (50 mL/Hr) IV Continuous <Continuous>  dextrose 50% Injectable 25 Gram(s) IV Push once  dextrose 50% Injectable 12.5 Gram(s) IV Push once  diltiazem    milliGRAM(s) Oral daily  folic acid 1 milliGRAM(s) Oral daily  glucagon  Injectable 1 milliGRAM(s) IntraMuscular once  insulin detemir injectable (LEVEMIR) 8 Unit(s) SubCutaneous daily  insulin lispro (ADMELOG) corrective regimen sliding scale   SubCutaneous three times a day before meals  insulin lispro (ADMELOG) corrective regimen sliding scale   SubCutaneous at bedtime  multivitamin 1 Tablet(s) Oral daily  pantoprazole    Tablet 40 milliGRAM(s) Oral before breakfast  thiamine 100 milliGRAM(s) Oral daily    MEDICATIONS  (PRN):  dextrose Oral Gel 15 Gram(s) Oral once PRN Blood Glucose LESS THAN 70 milliGRAM(s)/deciliter  LORazepam   Injectable 2 milliGRAM(s) IV Push every 2 hours PRN Symptom-triggered: 2 point increase in CIWA -Ar score and a total score of 7 or LESS      I&O's Summary    12 Jun 2024 07:01  -  13 Jun 2024 07:00  --------------------------------------------------------  IN: 50 mL / OUT: 0 mL / NET: 50 mL    13 Jun 2024 07:01  -  13 Jun 2024 13:03  --------------------------------------------------------  IN: 240 mL / OUT: 0 mL / NET: 240 mL        PHYSICAL EXAM:  Vital Signs Last 24 Hrs  T(C): 36.6 (13 Jun 2024 12:30), Max: 36.9 (12 Jun 2024 19:59)  T(F): 97.8 (13 Jun 2024 12:30), Max: 98.5 (13 Jun 2024 07:48)  HR: 75 (13 Jun 2024 12:30) (75 - 111)  BP: 125/61 (13 Jun 2024 12:30) (122/58 - 156/88)  BP(mean): 74 (13 Jun 2024 11:30) (74 - 99)  RR: 18 (13 Jun 2024 12:30) (17 - 22)  SpO2: 98% (13 Jun 2024 12:30) (94% - 100%)    Parameters below as of 13 Jun 2024 12:30  Patient On (Oxygen Delivery Method): room air      CONSTITUTIONAL: NAD  EYES: left eye with patch  ENMT: Moist oral mucosa, no pharyngeal injection or exudates  NECK: Supple, no palpable masses  RESPIRATORY: Normal respiratory effort; lungs are clear to auscultation bilaterally  CARDIOVASCULAR: Regular rate and rhythm, normal S1 and S2, no murmur/rub/gallop; No lower extremity edema; Peripheral pulses are 2+ bilaterally  ABDOMEN: Nontender to palpation, normoactive bowel sounds, no rebound/guarding  MUSCULOSKELETAL:  no clubbing or cyanosis of digits; no joint swelling or tenderness to palpation  PSYCH: A+O to person, place, and time; affect appropriate  NEUROLOGY: CN 2-12 are intact and symmetric; no gross sensory deficits   SKIN: No rashes; no palpable lesions    LABS:                        11.1   9.05  )-----------( 288      ( 13 Jun 2024 07:50 )             33.3     06-13    140  |  103  |  16  ----------------------------<  80  3.6   |  27  |  1.26    Ca    8.7      13 Jun 2024 07:50  Phos  3.3     06-13  Mg     1.70     06-13      PT/INR - ( 13 Jun 2024 07:50 )   PT: 11.2 sec;   INR: 1.00 ratio         PTT - ( 13 Jun 2024 07:50 )  PTT:28.4 sec      Urinalysis Basic - ( 13 Jun 2024 07:50 )    Color: x / Appearance: x / SG: x / pH: x  Gluc: 80 mg/dL / Ketone: x  / Bili: x / Urobili: x   Blood: x / Protein: x / Nitrite: x   Leuk Esterase: x / RBC: x / WBC x   Sq Epi: x / Non Sq Epi: x / Bacteria: x

## 2024-06-14 NOTE — DIETITIAN INITIAL EVALUATION ADULT - PERTINENT MEDS FT
MEDICATIONS  (STANDING):  acetaZOLAMIDE Injectable 500 milliGRAM(s) IV Push once  aMIOdarone    Tablet 50 milliGRAM(s) Oral daily  ARIPiprazole 2 milliGRAM(s) Oral daily  atorvastatin 80 milliGRAM(s) Oral at bedtime  busPIRone 10 milliGRAM(s) Oral <User Schedule>  busPIRone 20 milliGRAM(s) Oral <User Schedule>  chlorhexidine 2% Cloths 1 Application(s) Topical daily  cholecalciferol 1000 Unit(s) Oral daily  cyanocobalamin 1000 MICROGram(s) Oral daily  dextrose 10% Bolus 125 milliLiter(s) IV Bolus once  dextrose 5%. 1000 milliLiter(s) (100 mL/Hr) IV Continuous <Continuous>  dextrose 5%. 1000 milliLiter(s) (50 mL/Hr) IV Continuous <Continuous>  dextrose 50% Injectable 25 Gram(s) IV Push once  dextrose 50% Injectable 12.5 Gram(s) IV Push once  diltiazem    milliGRAM(s) Oral daily  folic acid 1 milliGRAM(s) Oral daily  glucagon  Injectable 1 milliGRAM(s) IntraMuscular once  insulin detemir injectable (LEVEMIR) 12 Unit(s) SubCutaneous daily  insulin lispro (ADMELOG) corrective regimen sliding scale   SubCutaneous three times a day before meals  insulin lispro (ADMELOG) corrective regimen sliding scale   SubCutaneous at bedtime  multivitamin 1 Tablet(s) Oral daily  pantoprazole    Tablet 40 milliGRAM(s) Oral before breakfast  thiamine 100 milliGRAM(s) Oral daily    MEDICATIONS  (PRN):  dextrose Oral Gel 15 Gram(s) Oral once PRN Blood Glucose LESS THAN 70 milliGRAM(s)/deciliter  LORazepam   Injectable 2 milliGRAM(s) IV Push every 2 hours PRN Symptom-triggered: 2 point increase in CIWA -Ar score and a total score of 7 or LESS

## 2024-06-14 NOTE — DIETITIAN INITIAL EVALUATION ADULT - OTHER INFO
Nutrition assessment for consult as above.    Poor to fair intake of meal observed at time of visit.  Patient denies chewing/swallowing issues.  Patient reported pain contributing to reduced PO intake. Patient reported she eats 2 meals per day and has a few snacks throughout course of the day at home. No reported food allergies.  No GI distress reported i.e. nausea, vomiting, diarrhea. Patient stated she checks sugars and takes her insulin, but doesn't follow any specific diet modifications. Reviewed plate method, no concentrated sweets with patient at bedside. Reported usual body weight ~130 pounds. Denies recent weight loss.

## 2024-06-14 NOTE — PHYSICAL THERAPY INITIAL EVALUATION ADULT - PERTINENT HX OF CURRENT PROBLEM, REHAB EVAL
78 year old Female transferred from [facility] for ophthalmology eval for possible globe rupture.

## 2024-06-14 NOTE — PHYSICAL THERAPY INITIAL EVALUATION ADULT - DID THE PATIENT HAVE SURGERY?
Ruptured globe, left eye Corneal sclaeral laceration with prolapse of uveal contents Gerry Harris/yes

## 2024-06-14 NOTE — DISCHARGE NOTE NURSING/CASE MANAGEMENT/SOCIAL WORK - NSDCFUADDAPPT_GEN_ALL_CORE_FT
Follow-up with your ophthalmologist or with [facility] Department of Ophthalmology in 1 week after discharge by 6/21/24 at:  [address]  [place] [address]  [phone]

## 2024-06-14 NOTE — DIETITIAN INITIAL EVALUATION ADULT - PERTINENT LABORATORY DATA
06-14    139  |  104  |  16  ----------------------------<  152<H>  3.2<L>   |  20<L>  |  1.54<H>    Ca    8.7      14 Jun 2024 05:01  Phos  3.7     06-14  Mg     1.80     06-14    TPro  6.5  /  Alb  3.4  /  TBili  0.7  /  DBili  x   /  AST  22  /  ALT  5   /  AlkPhos  94  06-14    A1C with Estimated Average Glucose Result: 6.0 % (06-13-24 @ 07:50)    CAPILLARY BLOOD GLUCOSE  POCT Blood Glucose.: 187 mg/dL (14 Jun 2024 09:37)  POCT Blood Glucose.: 164 mg/dL (13 Jun 2024 22:02)  POCT Blood Glucose.: 128 mg/dL (13 Jun 2024 17:39)  POCT Blood Glucose.: 182 mg/dL (13 Jun 2024 12:21)

## 2024-06-14 NOTE — DISCHARGE NOTE NURSING/CASE MANAGEMENT/SOCIAL WORK - PATIENT PORTAL LINK FT
You can access the FollowMyHealth Patient Portal offered by [facility] by registering at the following website: [email or web address]. By joining [facility]’s FollowMyHealth portal, you will also be able to view your health information using other applications (apps) compatible with our system.

## 2024-06-21 ENCOUNTER — APPOINTMENT (OUTPATIENT)
Dept: OPHTHALMOLOGY | Facility: CLINIC | Age: 79
End: 2024-06-21
Payer: MEDICARE

## 2024-06-21 ENCOUNTER — NON-APPOINTMENT (OUTPATIENT)
Age: 79
End: 2024-06-21

## 2024-06-21 PROCEDURE — 92002 INTRM OPH EXAM NEW PATIENT: CPT

## 2024-06-28 ENCOUNTER — APPOINTMENT (OUTPATIENT)
Dept: OPHTHALMOLOGY | Facility: CLINIC | Age: 79
End: 2024-06-28
Payer: MEDICARE

## 2024-06-28 ENCOUNTER — NON-APPOINTMENT (OUTPATIENT)
Age: 79
End: 2024-06-28

## 2024-06-28 PROCEDURE — 92012 INTRM OPH EXAM EST PATIENT: CPT

## 2024-07-11 ENCOUNTER — APPOINTMENT (OUTPATIENT)
Dept: OPHTHALMOLOGY | Facility: CLINIC | Age: 79
End: 2024-07-11
Payer: MEDICARE

## 2024-07-11 ENCOUNTER — NON-APPOINTMENT (OUTPATIENT)
Age: 79
End: 2024-07-11

## 2024-07-11 PROCEDURE — 92012 INTRM OPH EXAM EST PATIENT: CPT

## 2024-07-11 PROCEDURE — 92134 CPTRZ OPH DX IMG PST SGM RTA: CPT

## 2024-08-01 ENCOUNTER — APPOINTMENT (OUTPATIENT)
Dept: OPHTHALMOLOGY | Facility: CLINIC | Age: 79
End: 2024-08-01

## 2024-08-01 PROCEDURE — 92012 INTRM OPH EXAM EST PATIENT: CPT

## 2024-09-12 ENCOUNTER — APPOINTMENT (OUTPATIENT)
Dept: OPHTHALMOLOGY | Facility: CLINIC | Age: 79
End: 2024-09-12

## 2025-03-07 NOTE — H&P ADULT - REASON FOR ADMISSION
Chief Complaint   Patient presents with    Chest Pain     Since this morning that is centralized chest pain with associated SOB. Pt states HA as well. Pt had palpitations a couple of days leading up to the CP     Pt ambulatory to room for above complaint. Pt reports increased SOB with ambulation to room. Pt on monitors. /78, otherwise stable vitals. 96% on RA. IV access established. Chart up for ERP   not feeling well

## (undated) DEVICE — LABELS BLANK W PEN

## (undated) DEVICE — GLV 7.5 PROTEXIS (CREAM) MICRO

## (undated) DEVICE — NDL COUNTER FOAM AND MAGNET 10-20

## (undated) DEVICE — PACK MAJOR CHEST BREAST

## (undated) DEVICE — GOWN XL

## (undated) DEVICE — DRSG CURITY GAUZE SPONGE 4 X 4" 12-PLY

## (undated) DEVICE — BIPOLAR FORCEP CORD WECK STANDARD 12FT

## (undated) DEVICE — BLADE SCALPEL SAFETYLOCK #11

## (undated) DEVICE — DRAPE SPLIT SHEET 77" X 120"

## (undated) DEVICE — APPLICATOR COTTON TIP 3" STERILE

## (undated) DEVICE — DRAPE 1/2 SHEET 40X57"

## (undated) DEVICE — DRSG TAPE TRANSPORE 1"

## (undated) DEVICE — NDL HYPO SAFE 25G X 5/8" (ORANGE)